# Patient Record
Sex: MALE | Race: WHITE | NOT HISPANIC OR LATINO | Employment: FULL TIME | URBAN - METROPOLITAN AREA
[De-identification: names, ages, dates, MRNs, and addresses within clinical notes are randomized per-mention and may not be internally consistent; named-entity substitution may affect disease eponyms.]

---

## 2019-03-27 ENCOUNTER — HOSPITAL ENCOUNTER (OUTPATIENT)
Dept: OTHER | Facility: HOSPITAL | Age: 49
Discharge: HOME | End: 2019-03-27
Attending: NURSE PRACTITIONER

## 2019-03-28 ENCOUNTER — OFFICE VISIT CONVERTED (OUTPATIENT)
Dept: ORTHOPEDIC SURGERY | Facility: CLINIC | Age: 49
End: 2019-03-28
Attending: ORTHOPAEDIC SURGERY

## 2019-04-08 ENCOUNTER — HOSPITAL ENCOUNTER (OUTPATIENT)
Dept: PREADMISSION TESTING | Facility: HOSPITAL | Age: 49
Discharge: HOME | End: 2019-04-08
Attending: ORTHOPAEDIC SURGERY

## 2019-04-08 LAB
ANION GAP SERPL CALC-SCNC: 18 MMOL/L (ref 8–19)
APTT BLD: 23.7 S (ref 22.2–34.2)
BASOPHILS # BLD AUTO: 0.07 10*3/UL (ref 0–0.2)
BASOPHILS NFR BLD AUTO: 1 % (ref 0–3)
BUN SERPL-MCNC: 16 MG/DL (ref 5–25)
BUN/CREAT SERPL: 23 {RATIO} (ref 6–20)
CALCIUM SERPL-MCNC: 9.9 MG/DL (ref 8.7–10.4)
CHLORIDE SERPL-SCNC: 97 MMOL/L (ref 99–111)
CONV ABS IMM GRAN: 0.02 10*3/UL (ref 0–0.2)
CONV CO2: 25 MMOL/L (ref 22–32)
CONV IMMATURE GRAN: 0.3 % (ref 0–1.8)
CREAT UR-MCNC: 0.7 MG/DL (ref 0.7–1.2)
DEPRECATED RDW RBC AUTO: 41.6 FL (ref 35.1–43.9)
EOSINOPHIL # BLD AUTO: 0.16 10*3/UL (ref 0–0.7)
EOSINOPHIL # BLD AUTO: 2.4 % (ref 0–7)
ERYTHROCYTE [DISTWIDTH] IN BLOOD BY AUTOMATED COUNT: 14.2 % (ref 11.6–14.4)
GFR SERPLBLD BASED ON 1.73 SQ M-ARVRAT: >60 ML/MIN/{1.73_M2}
GLUCOSE SERPL-MCNC: 179 MG/DL (ref 70–99)
HBA1C MFR BLD: 14.9 G/DL (ref 14–18)
HCT VFR BLD AUTO: 43 % (ref 42–52)
INR PPP: 0.95 (ref 2–3)
LYMPHOCYTES # BLD AUTO: 2.06 10*3/UL (ref 1–5)
MCH RBC QN AUTO: 28.4 PG (ref 27–31)
MCHC RBC AUTO-ENTMCNC: 34.7 G/DL (ref 33–37)
MCV RBC AUTO: 82.1 FL (ref 80–96)
MONOCYTES # BLD AUTO: 0.63 10*3/UL (ref 0.2–1.2)
MONOCYTES NFR BLD AUTO: 9.4 % (ref 3–10)
NEUTROPHILS # BLD AUTO: 3.77 10*3/UL (ref 2–8)
NEUTROPHILS NFR BLD AUTO: 56.2 % (ref 30–85)
NRBC CBCN: 0 % (ref 0–0.7)
OSMOLALITY SERPL CALC.SUM OF ELEC: 288 MOSM/KG (ref 273–304)
PLATELET # BLD AUTO: 230 10*3/UL (ref 130–400)
PMV BLD AUTO: 11 FL (ref 9.4–12.4)
POTASSIUM SERPL-SCNC: 4.3 MMOL/L (ref 3.5–5.3)
PROTHROMBIN TIME: 10 S (ref 9.4–12)
RBC # BLD AUTO: 5.24 10*6/UL (ref 4.7–6.1)
SODIUM SERPL-SCNC: 136 MMOL/L (ref 135–147)
VARIANT LYMPHS NFR BLD MANUAL: 30.7 % (ref 20–45)
WBC # BLD AUTO: 6.71 10*3/UL (ref 4.8–10.8)

## 2019-05-09 ENCOUNTER — OFFICE VISIT CONVERTED (OUTPATIENT)
Dept: ORTHOPEDIC SURGERY | Facility: CLINIC | Age: 49
End: 2019-05-09
Attending: ORTHOPAEDIC SURGERY

## 2019-05-13 ENCOUNTER — HOSPITAL ENCOUNTER (OUTPATIENT)
Dept: PHYSICAL THERAPY | Facility: CLINIC | Age: 49
Setting detail: RECURRING SERIES
Discharge: HOME | End: 2019-07-15
Attending: ORTHOPAEDIC SURGERY

## 2019-06-11 ENCOUNTER — OFFICE VISIT CONVERTED (OUTPATIENT)
Dept: ORTHOPEDIC SURGERY | Facility: CLINIC | Age: 49
End: 2019-06-11
Attending: ORTHOPAEDIC SURGERY

## 2019-11-19 ENCOUNTER — OFFICE VISIT CONVERTED (OUTPATIENT)
Dept: ORTHOPEDIC SURGERY | Facility: CLINIC | Age: 49
End: 2019-11-19
Attending: ORTHOPAEDIC SURGERY

## 2019-11-19 ENCOUNTER — CONVERSION ENCOUNTER (OUTPATIENT)
Dept: ORTHOPEDIC SURGERY | Facility: CLINIC | Age: 49
End: 2019-11-19

## 2021-05-15 VITALS — BODY MASS INDEX: 37.8 KG/M2 | OXYGEN SATURATION: 91 % | WEIGHT: 270 LBS | HEART RATE: 86 BPM | HEIGHT: 71 IN

## 2021-05-15 VITALS — OXYGEN SATURATION: 99 % | WEIGHT: 260 LBS | HEIGHT: 71 IN | HEART RATE: 84 BPM | BODY MASS INDEX: 36.4 KG/M2

## 2021-05-15 VITALS — HEART RATE: 77 BPM | OXYGEN SATURATION: 95 % | HEIGHT: 71 IN

## 2021-05-15 VITALS — HEIGHT: 71 IN | OXYGEN SATURATION: 98 % | HEART RATE: 77 BPM

## 2022-07-21 ENCOUNTER — TRANSCRIBE ORDERS (OUTPATIENT)
Dept: PHYSICAL THERAPY | Facility: CLINIC | Age: 52
End: 2022-07-21

## 2022-07-21 ENCOUNTER — TREATMENT (OUTPATIENT)
Dept: PHYSICAL THERAPY | Facility: CLINIC | Age: 52
End: 2022-07-21

## 2022-07-21 DIAGNOSIS — S66.127D LACERATION OF FLEXOR MUSCLE, FASCIA AND TENDON OF LEFT LITTLE FINGER AT WRIST AND HAND LEVEL, SUBSEQUENT ENCOUNTER: Primary | ICD-10-CM

## 2022-07-21 DIAGNOSIS — M79.644 PAIN OF FINGER OF RIGHT HAND: ICD-10-CM

## 2022-07-21 DIAGNOSIS — M25.641 STIFFNESS OF FINGER JOINT OF RIGHT HAND: ICD-10-CM

## 2022-07-21 PROCEDURE — 97166 OT EVAL MOD COMPLEX 45 MIN: CPT | Performed by: OCCUPATIONAL THERAPIST

## 2022-07-21 PROCEDURE — 97110 THERAPEUTIC EXERCISES: CPT | Performed by: OCCUPATIONAL THERAPIST

## 2022-07-21 NOTE — PROGRESS NOTES
Outpatient Occupational Therapy Ortho Initial Evaluation    Patient: Manuel Demarco   : 1970  Diagnosis/ICD-10 Code:  Laceration of flexor muscle, fascia and tendon of left little finger at wrist and hand level, subsequent encounter [S66.127D]  Referring practitioner: Bradley Valenzuela, *  Date of Initial Visit: 2022  Today's Date: 2022  Patient seen for 1 sessions               Subjective Questionnaire: QuickDASH: 45      Subjective Evaluation    History of Present Illness  Date of onset: 2022  Date of surgery: 7/15/2022  Mechanism of injury: Cutting the exhaust off of the truck on 22 the saw blade cut through  FDS and FDP of R SF.  Surgical repair 7/15/22 brace placed on 22      Patient Occupation:  - cooks and manages   Precautions and Work Restrictions: one handed work Quality of life: excellent    Pain  Current pain ratin  At worst pain rating: 3  Quality: needle-like and throbbing  Relieving factors: medications (norco 325)  Aggravating factors: repetitive movement and lifting  Progression: improved    Social Support  Lives in: one-story house  Lives with: spouse and young children    Hand dominance: left    Treatments  Previous treatment: immobilization  Patient Goals  Patient goals for therapy: decreased edema, increased motion, decreased pain, increased strength, independence with ADLs/IADLs and return to work  Patient goal: get back to normal         Past Medical hx: diabetic, heart attacks x 2, high blood pressure    Objective          Neurological Testing     Sensation     Wrist/Hand     Right   Diminished: light touch    Comments   Right light touch: 3.61 RF and SF    Active Range of Motion     Additional Active Range of Motion Details  0-70 0-70 0-35  0-70 0-70 0-40  0-70 0-70 0-35  10-40 15-50 0-40    Swelling     Right Wrist/Hand   Little     Middle: 6.6 cm  Circumference MCP: 21.5 cm  Circumference wrist: 17.3 cm          Assessment & Plan      Assessment  Impairments: abnormal coordination, abnormal muscle firing, abnormal or restricted ROM, activity intolerance, impaired physical strength, lacks appropriate home exercise program, pain with function, safety issue and weight-bearing intolerance  Functional Limitations: carrying objects, lifting, pulling, pushing, reaching behind back, reaching overhead and unable to perform repetitive tasks  Assessment details: Pt presents with s/p FDS and FDP laceration repair in R SF  In dorsal blocking splint with stiffness, pain, edema, and decreased functional .  Pt is limited secondary to orthopedic precautions, decreased strength, increased swelling, and decreased use of R hand.   Prognosis: good    Goals  Plan Goals: 1. The patient complains of pain in the R sf                   LTG 1: 12 weeks:  The patient will report a pain rating of 0/10 or better in order to improve sleep quality and tolerance to performance of activities of daily living.                                  STATUS:  New                  STG 1a: 4 weeks:  The patient will report a pain rating of 2/10 or better.                                   STATUS:  New  2. The patient has limited ROM of the R SF                  LTG 2: 12 weeks:  The patient will demonstrate 240 degrees of HEARD to allow the patient to  knife for cooking/ meal prep.                                  STATUS:  New                   STG 2a: 4 weeks:  The patient will demonstrate 120 degrees of HEARD.                                  STATUS:  New                             3. The patient has limited strength of the R hand.                  LTG 3: 12 weeks:  The patient will demonstrate 50# in order to return to lifting and gripping.                                  STATUS:  New                  STG 3a: 8 weeks:  The patient will demonstrate tolerance to light strengthening without adverse reaction.                                  STATUS:  New  4. Carrying, Moving, and  Handling Objects Functional Limitation                                   LTG 4: 12 weeks:  The patient will demonstrate 0% limitation by achieving a score of 11 on the Quick DASH.                                  STATUS:  New                  STG 4a: 4 weeks:  The patient will demonstrate 20-39% limitation by achieving a score of 27 on the Quick DASH.                                    STATUS:  New                    TREATMENT: Orthotic fabrication/fitting/management and training, Manual therapy, therapeutic exercise, home exercise instruction, and modalities as needed to include: electrical stimulation, ultrasound, moist heat, paraffin, fluidotherapy and ice.      Plan  Planned modality interventions: TENS, thermotherapy (hydrocollator packs), thermotherapy (paraffin bath), ultrasound and electrical stimulation/Russian stimulation  Other planned modality interventions: fluidotherapy  Planned therapy interventions: manual therapy, ADL retraining, motor coordination training, neuromuscular re-education, soft tissue mobilization, fine motor coordination training, body mechanics training, balance/weight-bearing training, functional ROM exercises, flexibility, spinal/joint mobilization, strengthening, stretching, therapeutic activities, IADL retraining, joint mobilization and home exercise program  Frequency: 2x week  Duration in weeks: 12  Treatment plan discussed with: patient        Patient is indicated for skilled occupational therapy services.    History # of Personal Factors and/or Comorbidities: MODERATE (1-2)  Examination of Body System(s): # of elements: MODERATE (3)  Clinical Presentation: EVOLVING  Clinical Decision Making: MODERATE     Evaluation:  Low Complexity:    0     mins  39870;  Mod Complexity:    30     mins  07295;  High Complexity:    0     mins  09334;    Timed:  Manual Therapy:    0     mins  33563;  Therapeutic Exercise:    10     mins  52497;  Therapeutic Activity:    0     mins  89843;      Neuromuscular Michael:    0    mins  12664;    Ultrasound:     0     mins  28388;    Electrical Stimulation:    0     mins  55709;    Untimed:  Electrical Stimulation:    0     mins  68763 ( );  Fluidotherapy:        0    mins  59497  Paraffin:                          0    mins  18788    Timed Treatment:   10   mins   Total Treatment:     40   mins      OT SIGNATURE: DHARMESH Greene, OTR/L, CHT     Electronically signed    [place] LICENSE: [id number]   DATE TREATMENT INITIATED: 7/21/2022    Initial Certification  Certification Period: 7/21/2022 thru 10/18/2022  I certify that the therapy services are furnished while this patient is under my care.  The services outlined above are required by this patient, and will be reviewed every 90 days.     Signature:______________________________________________ PHYSICIAN:  Bradley Barcenas MD   NPI: [id number]                                      DATE:    Please sign and return via fax to [phone]   Thank you, [facility]ational Therapy.

## 2022-07-25 ENCOUNTER — TREATMENT (OUTPATIENT)
Dept: PHYSICAL THERAPY | Facility: CLINIC | Age: 52
End: 2022-07-25

## 2022-07-25 DIAGNOSIS — S66.127D LACERATION OF FLEXOR MUSCLE, FASCIA AND TENDON OF LEFT LITTLE FINGER AT WRIST AND HAND LEVEL, SUBSEQUENT ENCOUNTER: Primary | ICD-10-CM

## 2022-07-25 DIAGNOSIS — M25.641 STIFFNESS OF FINGER JOINT OF RIGHT HAND: ICD-10-CM

## 2022-07-25 DIAGNOSIS — M79.644 PAIN OF FINGER OF RIGHT HAND: ICD-10-CM

## 2022-07-25 PROCEDURE — 97112 NEUROMUSCULAR REEDUCATION: CPT | Performed by: OCCUPATIONAL THERAPIST

## 2022-07-25 PROCEDURE — 97530 THERAPEUTIC ACTIVITIES: CPT | Performed by: OCCUPATIONAL THERAPIST

## 2022-07-25 NOTE — PROGRESS NOTES
Occupational Therapy Daily Treatment Note      Patient: Manuel Demarco   : 1970  Referring practitioner: Bradley Valenzuela, *  Date of Initial Visit: Type: THERAPY  Noted: 2022  Today's Date: 2022  Patient seen for 2 sessions    ICD-10-CM ICD-9-CM   1. Laceration of flexor muscle, fascia and tendon of left little finger at wrist and hand level, subsequent encounter  S66.127D 842.10   2. Stiffness of finger joint of right hand  M25.641 719.54   3. Pain of finger of right hand  M79.644 729.5          Manuel Demarco reports I have been keeping it moving.       Objective   See Exercise, Manual, and Modality Logs for complete treatment.   SF  45-60  0-70  0-50    22.1 cm MP  6.5 cm PIP SF      Assessment/Plan  Pt is doing excellent with AROM, and edema control.  1x/wk until 4 weeks s/p sx and then back to 2x/wk    Cont per POC           Timed:  Manual Therapy:    0     mins  90414;  Therapeutic Exercise:    10     mins  69240;  Therapeutic Activity:    10     mins  49989;     Neuromuscular Michael:    10    mins  44219;    Ultrasound:     0     mins  84667;    Electrical Stimulation:    0     mins  64066;    Untimed:  Electrical Stimulation:    0     mins  73556 ( );  Fluidotherapy:        0    mins  43374  Paraffin:                          0    mins  83986    Timed Treatment:   30   mins   Total Treatment:     30   mins    OT SIGNATURE: DHARMESH Greene, OTR/L, CHT     Electronically signed    [place] LICENSE: [id number]

## 2022-08-01 ENCOUNTER — TREATMENT (OUTPATIENT)
Dept: PHYSICAL THERAPY | Facility: CLINIC | Age: 52
End: 2022-08-01

## 2022-08-01 DIAGNOSIS — M25.641 STIFFNESS OF FINGER JOINT OF RIGHT HAND: ICD-10-CM

## 2022-08-01 DIAGNOSIS — S66.127D LACERATION OF FLEXOR MUSCLE, FASCIA AND TENDON OF LEFT LITTLE FINGER AT WRIST AND HAND LEVEL, SUBSEQUENT ENCOUNTER: Primary | ICD-10-CM

## 2022-08-01 DIAGNOSIS — M79.644 PAIN OF FINGER OF RIGHT HAND: ICD-10-CM

## 2022-08-01 PROCEDURE — 97110 THERAPEUTIC EXERCISES: CPT | Performed by: OCCUPATIONAL THERAPIST

## 2022-08-01 PROCEDURE — 97112 NEUROMUSCULAR REEDUCATION: CPT | Performed by: OCCUPATIONAL THERAPIST

## 2022-08-01 NOTE — PROGRESS NOTES
Occupational Therapy Daily Treatment Note      Patient: Manuel Demarco   : 1970  Referring practitioner: Bradley Valenzuela, *  Date of Initial Visit: Type: THERAPY  Noted: 2022  Today's Date: 2022  Patient seen for 3 sessions    ICD-10-CM ICD-9-CM   1. Laceration of flexor muscle, fascia and tendon of left little finger at wrist and hand level, subsequent encounter  S66.127D 842.10   2. Stiffness of finger joint of right hand  M25.601 719.54   3. Pain of finger of right hand  M79.645 729.5          Manuel Demarco reports I am feeling good, ready to be out of the splint     Objective   See Exercise, Manual, and Modality Logs for complete treatment.   Full loose fist into palm this date.    Assessment/Plan  Pt is moving excellent, able to hold place and holds fair this date.       Cont per POC           Timed:  Manual Therapy:    0     mins  92218;  Therapeutic Exercise:    10     mins  29784;  Therapeutic Activity:    10     mins  01625;     Neuromuscular Michael:    10    mins  20896;    Ultrasound:     0     mins  20299;    Electrical Stimulation:    0     mins  52191;    Untimed:  Electrical Stimulation:    0     mins  84711 ( );  Fluidotherapy:        0    mins  44567  Paraffin:                          0    mins  35975    Timed Treatment:   30   mins   Total Treatment:     30   mins    OT SIGNATURE: DHARMESH Greene, OTR/L, CHT     Electronically signed    [place] LICENSE: [id number]

## 2022-08-08 ENCOUNTER — TREATMENT (OUTPATIENT)
Dept: PHYSICAL THERAPY | Facility: CLINIC | Age: 52
End: 2022-08-08

## 2022-08-08 DIAGNOSIS — M79.644 PAIN OF FINGER OF RIGHT HAND: ICD-10-CM

## 2022-08-08 DIAGNOSIS — M25.641 STIFFNESS OF FINGER JOINT OF RIGHT HAND: ICD-10-CM

## 2022-08-08 DIAGNOSIS — S66.127D LACERATION OF FLEXOR MUSCLE, FASCIA AND TENDON OF LEFT LITTLE FINGER AT WRIST AND HAND LEVEL, SUBSEQUENT ENCOUNTER: Primary | ICD-10-CM

## 2022-08-08 PROCEDURE — 97110 THERAPEUTIC EXERCISES: CPT | Performed by: OCCUPATIONAL THERAPIST

## 2022-08-08 PROCEDURE — 97112 NEUROMUSCULAR REEDUCATION: CPT | Performed by: OCCUPATIONAL THERAPIST

## 2022-08-08 NOTE — PROGRESS NOTES
Occupational Therapy Daily Treatment Note      Patient: Manuel Demarco   : 1970  Referring practitioner: Bradley Valenzuela, *  Date of Initial Visit: Type: THERAPY  Noted: 2022  Today's Date: 2022  Patient seen for 4 sessions    ICD-10-CM ICD-9-CM   1. Laceration of flexor muscle, fascia and tendon of left little finger at wrist and hand level, subsequent encounter  S66.127D 842.10   2. Stiffness of finger joint of right hand  M25.641 719.54   3. Pain of finger of right hand  M79.644 729.5          Manuel Demarco reports my palm is constantly burning now        Objective   See Exercise, Manual, and Modality Logs for complete treatment.   Burning pain improved with sensory stimulation and scar massage.    Assessment/Plan  About half of the stitches are out, continue to encourage scar mobilization and STM in palm.  Added textured desensitization in palm with HEP in brace to protect over extension of MP      Cont per POC           Timed:  Manual Therapy:    10     mins  93699;  Therapeutic Exercise:    10     mins  53525;  Therapeutic Activity:    0     mins  92904;     Neuromuscular Michael:    10    mins  22571;    Ultrasound:     0     mins  39852;    Electrical Stimulation:    0     mins  66403;    Untimed:  Electrical Stimulation:    0     mins  04268 ( );  Fluidotherapy:        0    mins  29551  Paraffin:                          0    mins  11979    Timed Treatment:   30   mins   Total Treatment:     30   mins    OT SIGNATURE: DHARMESH Greene, OTR/L, CHT     Electronically signed    [place] LICENSE: [id number]

## 2022-08-15 ENCOUNTER — TELEPHONE (OUTPATIENT)
Dept: PHYSICAL THERAPY | Facility: CLINIC | Age: 52
End: 2022-08-15

## 2022-09-01 ENCOUNTER — TREATMENT (OUTPATIENT)
Dept: PHYSICAL THERAPY | Facility: CLINIC | Age: 52
End: 2022-09-01

## 2022-09-01 DIAGNOSIS — S66.127D LACERATION OF FLEXOR MUSCLE, FASCIA AND TENDON OF LEFT LITTLE FINGER AT WRIST AND HAND LEVEL, SUBSEQUENT ENCOUNTER: Primary | ICD-10-CM

## 2022-09-01 DIAGNOSIS — M79.644 PAIN OF FINGER OF RIGHT HAND: ICD-10-CM

## 2022-09-01 DIAGNOSIS — M25.641 STIFFNESS OF FINGER JOINT OF RIGHT HAND: ICD-10-CM

## 2022-09-01 PROCEDURE — 97530 THERAPEUTIC ACTIVITIES: CPT | Performed by: OCCUPATIONAL THERAPIST

## 2022-09-01 PROCEDURE — 97112 NEUROMUSCULAR REEDUCATION: CPT | Performed by: OCCUPATIONAL THERAPIST

## 2022-09-01 NOTE — PROGRESS NOTES
Re-Assessment / Re-Certification      Patient: Manuel Demarco   : 1970  Diagnosis/ICD-10 Code:  Laceration of flexor muscle, fascia and tendon of left little finger at wrist and hand level, subsequent encounter [S66.127D]  Referring practitioner: Bradley Valenzuela, *  Date of Initial Visit: Type: THERAPY  Noted: 2022  Today's Date: 2022  Patient seen for 5 sessions      Subjective:   Manuel Demarco reports: Pt reports he ruptured his tendon 8/15/22 in his sleep.  Had second repair 22. Patient is 14 days s/p flexor tendon repair of FDS and FDP.  Presents in post op dressings.   Subjective Questionnaire: QuickDASH: 55  Clinical Progress: improved  Home Program Compliance: No  Treatment has included: therapeutic exercise, neuromuscular re-education, manual therapy and therapeutic activity    Subjective   Objective   Neurological Testing      Sensation      Wrist/Hand      Right   Diminished: light touch     Comments   Right light touch: 2.83 SW all digits     Active Range of Motion     Additional Active Range of Motion Details  50-60     20-35     0-20  50-60     25-40     0-20  50-60     25-40     0-15  35-40     30-30     15-30     Swelling      Right Wrist/Hand   Little     Middle: 6 cm  Circumference MCP: 21.2 cm  Circumference wrist: 18.5 cm   Assessment/Plan    Visit Diagnoses:    ICD-10-CM ICD-9-CM   1. Laceration of flexor muscle, fascia and tendon of left little finger at wrist and hand level, subsequent encounter  S66.127D 842.10   2. Stiffness of finger joint of right hand  M25.641 719.54   3. Pain of finger of right hand  M79.644 729.5       Progress toward previous goals: Partially Met  Plan Goals: 1. The patient complains of pain in the R sf                         LTG 1: 12 weeks:  The patient will report a pain rating of 0/10 or better in order to improve sleep quality and tolerance to performance of activities of daily living.                                  STATUS:  NOT  MET                  STG 1a: 4 weeks:  The patient will report a pain rating of 2/10 or better.                                   STATUS:  NOT MET  2. The patient has limited ROM of the R SF                  LTG 2: 12 weeks:  The patient will demonstrate 240 degrees of HEARD to allow the patient to  knife for cooking/ meal prep.                                  STATUS:  NOT MET                  STG 2a: 4 weeks:  The patient will demonstrate 120 degrees of HEARD.                                  STATUS:  NOT MET                          3. The patient has limited strength of the R hand.                  LTG 3: 12 weeks:  The patient will demonstrate 50# in order to return to lifting and gripping.                                  STATUS:  NOT MET                  STG 3a: 8 weeks:  The patient will demonstrate tolerance to light strengthening without adverse reaction.                                  STATUS:  NOT MET  4. Carrying, Moving, and Handling Objects Functional Limitation                                   LTG 4: 12 weeks:  The patient will demonstrate 0% limitation by achieving a score of 11 on the Quick DASH.                                  STATUS:  NOT MET                  STG 4a: 4 weeks:  The patient will demonstrate 20-39% limitation by achieving a score of 27 on the Quick DASH.                                    STATUS:  NOT MET      Recommendations: Continue as planned  Timeframe: 2 months  Prognosis to achieve goals: good      OT SIGNATURE: DHARMESH Greene, OTR/L, CHT     Electronically signed    [place] LICENSE: [id number]   DATE TREATMENT INITIATED: 9/1/2022      90 Day Recertification  Certification Period: 9/1/2022 thru 11/29/2022  I certify that the therapy services are furnished while this patient is under my care.  The services outlined above are required by this patient, and will be reviewed every 90 days.      Based upon review of the patient's progress and continued therapy plan, it is my medical  opinion that Manuel Demarco should continue occupational therapy treatment at [facility]  [address]  [place] [address] [phone].    Signature: __________________________________  PHYSICIAN: Bradley Barcenas MD  NPI: [id number]                                      DATE:      Timed:  Manual Therapy:    0     mins  16675;  Therapeutic Exercise:    10     mins  20439;  Therapeutic Activity:    10     mins  69763;     Neuromuscular Michael:    10    mins  95766;    Ultrasound:     0     mins  91343;    Electrical Stimulation:    0     mins  44826;    Untimed:  Electrical Stimulation:    0     mins  16985 ( );  Fluidotherapy:        0    mins  73872  Paraffin:                          0    mins  23046    Timed Treatment:   30   mins   Total Treatment:     30   mins

## 2022-09-06 ENCOUNTER — TREATMENT (OUTPATIENT)
Dept: PHYSICAL THERAPY | Facility: CLINIC | Age: 52
End: 2022-09-06

## 2022-09-06 DIAGNOSIS — S66.127D LACERATION OF FLEXOR MUSCLE, FASCIA AND TENDON OF LEFT LITTLE FINGER AT WRIST AND HAND LEVEL, SUBSEQUENT ENCOUNTER: Primary | ICD-10-CM

## 2022-09-06 DIAGNOSIS — M25.641 STIFFNESS OF FINGER JOINT OF RIGHT HAND: ICD-10-CM

## 2022-09-06 DIAGNOSIS — M79.644 PAIN OF FINGER OF RIGHT HAND: ICD-10-CM

## 2022-09-06 PROCEDURE — 97530 THERAPEUTIC ACTIVITIES: CPT | Performed by: OCCUPATIONAL THERAPIST

## 2022-09-06 PROCEDURE — 97112 NEUROMUSCULAR REEDUCATION: CPT | Performed by: OCCUPATIONAL THERAPIST

## 2022-09-06 NOTE — PROGRESS NOTES
Occupational Therapy Daily Treatment Note      Patient: Manuel Demarco   : 1970  Referring practitioner: Bradley Valenzuela, *  Date of Initial Visit: Type: THERAPY  Noted: 2022  Today's Date: 2022  Patient seen for 6 sessions    ICD-10-CM ICD-9-CM   1. Laceration of flexor muscle, fascia and tendon of left little finger at wrist and hand level, subsequent encounter  S66.127D 842.10   2. Stiffness of finger joint of right hand  M25.641 719.54   3. Pain of finger of right hand  M79.644 729.5          Manuel Demarco reports I am doing better.      Objective   See Exercise, Manual, and Modality Logs for complete treatment.   Pt progressing with PROM into flexion.  Swelling pockets noted, but decreased after PROM. Added gentle compression sleeve under brace.  Patient educated and return verbalized understanding of wear and care.     Assessment/Plan  Passively can tolerate almost full fist, trace movement.  Did not have patient do active ROM       Cont per POC           Timed:  Manual Therapy:    10     mins  28711;  Therapeutic Exercise:    0     mins  06274;  Therapeutic Activity:    10     mins  01172;     Neuromuscular Michael:    10    mins  38622;    Ultrasound:     0     mins  13497;    Electrical Stimulation:    0     mins  45816;    Untimed:  Electrical Stimulation:    0     mins  02947 ( );  Fluidotherapy:        0    mins  96470  Paraffin:                          0    mins  67696    Timed Treatment:   30   mins   Total Treatment:     30   mins    OT SIGNATURE: DHARMESH Greene, OTR/L, CHT     Electronically signed    [place] LICENSE: [id number]

## 2022-09-14 ENCOUNTER — TELEPHONE (OUTPATIENT)
Dept: PHYSICAL THERAPY | Facility: CLINIC | Age: 52
End: 2022-09-14

## 2022-09-19 ENCOUNTER — TREATMENT (OUTPATIENT)
Dept: PHYSICAL THERAPY | Facility: CLINIC | Age: 52
End: 2022-09-19

## 2022-09-19 DIAGNOSIS — M79.644 PAIN OF FINGER OF RIGHT HAND: ICD-10-CM

## 2022-09-19 DIAGNOSIS — M25.641 STIFFNESS OF FINGER JOINT OF RIGHT HAND: ICD-10-CM

## 2022-09-19 DIAGNOSIS — S66.127D LACERATION OF FLEXOR MUSCLE, FASCIA AND TENDON OF LEFT LITTLE FINGER AT WRIST AND HAND LEVEL, SUBSEQUENT ENCOUNTER: Primary | ICD-10-CM

## 2022-09-19 PROCEDURE — 97530 THERAPEUTIC ACTIVITIES: CPT | Performed by: OCCUPATIONAL THERAPIST

## 2022-09-19 PROCEDURE — 97110 THERAPEUTIC EXERCISES: CPT | Performed by: OCCUPATIONAL THERAPIST

## 2022-09-19 NOTE — PROGRESS NOTES
Occupational Therapy Daily Treatment Note      Patient: Manuel Demarco   : 1970  Referring practitioner: Bradley Valenzuela, *  Date of Initial Visit: Type: THERAPY  Noted: 2022  Today's Date: 2022  Patient seen for 7 sessions    ICD-10-CM ICD-9-CM   1. Laceration of flexor muscle, fascia and tendon of left little finger at wrist and hand level, subsequent encounter  S66.127D 842.10   2. Stiffness of finger joint of right hand  M25.381 719.54   3. Pain of finger of right hand  M25.645 729.5          Manuel Demarco reports I have been taking my brace off at home when I'm sitting because the swelling needs to go down.  I don't do anything when it is off, and put it on when I use it.     Objective   See Exercise, Manual, and Modality Logs for complete treatment.   Pt having poor tendon excursion of SF both FDS and FDP caught in scar tissue.    Assessment/Plan  Scar adhesions limiting functional fisting, continued edema      Cont per POC           Timed:  Manual Therapy:    10     mins  93927;  Therapeutic Exercise:    10     mins  36503;  Therapeutic Activity:    10     mins  17401;     Neuromuscular Michael:    0    mins  92126;    Ultrasound:     0     mins  30205;    Electrical Stimulation:    0     mins  89892;    Untimed:  Electrical Stimulation:    0     mins  17453 ( );  Fluidotherapy:        0    mins  67363  Paraffin:                          0    mins  84728    Timed Treatment:   30   mins   Total Treatment:     30   mins    OT SIGNATURE: DHARMESH Greene, OTR/L, CHT     Electronically signed    [place] LICENSE: [id number]

## 2022-09-20 ENCOUNTER — TREATMENT (OUTPATIENT)
Dept: PHYSICAL THERAPY | Facility: CLINIC | Age: 52
End: 2022-09-20

## 2022-09-20 DIAGNOSIS — M79.644 PAIN OF FINGER OF RIGHT HAND: ICD-10-CM

## 2022-09-20 DIAGNOSIS — M25.641 STIFFNESS OF FINGER JOINT OF RIGHT HAND: ICD-10-CM

## 2022-09-20 DIAGNOSIS — S66.127D LACERATION OF FLEXOR MUSCLE, FASCIA AND TENDON OF LEFT LITTLE FINGER AT WRIST AND HAND LEVEL, SUBSEQUENT ENCOUNTER: Primary | ICD-10-CM

## 2022-09-20 PROCEDURE — 97110 THERAPEUTIC EXERCISES: CPT | Performed by: OCCUPATIONAL THERAPIST

## 2022-09-20 PROCEDURE — 97530 THERAPEUTIC ACTIVITIES: CPT | Performed by: OCCUPATIONAL THERAPIST

## 2022-09-20 NOTE — PROGRESS NOTES
Occupational Therapy Daily Treatment Note      Patient: Manuel Demarco   : 1970  Referring practitioner: Bradley Valenzuela, *  Date of Initial Visit: Type: THERAPY  Noted: 2022  Today's Date: 2022  Patient seen for 8 sessions    ICD-10-CM ICD-9-CM   1. Laceration of flexor muscle, fascia and tendon of left little finger at wrist and hand level, subsequent encounter  S66.127D 842.10   2. Stiffness of finger joint of right hand  M25.641 719.54   3. Pain of finger of right hand  M79.644 729.5          Manuel Demarco reports I got a stitch out of the palm.  The scar pad helped it feel better.     Objective   See Exercise, Manual, and Modality Logs for complete treatment.   SF  0-60  35-40  25-45      Assessment/Plan  Pt moving with better tendon excursion into flexion this date.       Cont per POC           Timed:  Manual Therapy:    10     mins  01163;  Therapeutic Exercise:    10     mins  07041;  Therapeutic Activity:    10     mins  46890;     Neuromuscular Michael:    0    mins  87411;    Ultrasound:     0     mins  14757;    Electrical Stimulation:    0     mins  68203;    Untimed:  Electrical Stimulation:    0     mins  07970 ( );  Fluidotherapy:        0    mins  83593  Paraffin:                          0    mins  55936    Timed Treatment:   30   mins   Total Treatment:     30   mins    OT SIGNATURE: DHARMESH Greene, OTR/L, CHT     Electronically signed    [place] LICENSE: [id number]

## 2022-09-27 ENCOUNTER — TREATMENT (OUTPATIENT)
Dept: PHYSICAL THERAPY | Facility: CLINIC | Age: 52
End: 2022-09-27

## 2022-09-27 DIAGNOSIS — M79.644 PAIN OF FINGER OF RIGHT HAND: ICD-10-CM

## 2022-09-27 DIAGNOSIS — S66.127D LACERATION OF FLEXOR MUSCLE, FASCIA AND TENDON OF LEFT LITTLE FINGER AT WRIST AND HAND LEVEL, SUBSEQUENT ENCOUNTER: Primary | ICD-10-CM

## 2022-09-27 DIAGNOSIS — M25.641 STIFFNESS OF FINGER JOINT OF RIGHT HAND: ICD-10-CM

## 2022-09-27 PROCEDURE — 97530 THERAPEUTIC ACTIVITIES: CPT | Performed by: PHYSICAL THERAPIST

## 2022-09-27 PROCEDURE — 97110 THERAPEUTIC EXERCISES: CPT | Performed by: PHYSICAL THERAPIST

## 2022-09-27 NOTE — PROGRESS NOTES
Occupational Therapy Daily Treatment Note      Patient: Manuel Demarco   : 1970  Referring practitioner: Bradley Valenzuela, *  Date of Initial Visit: Type: THERAPY  Noted: 2022  Today's Date: 2022  Patient seen for 9 sessions         Manuel Demarco reports: pt reports his splint is falling apart and also that it feels like its on fire at the volar MCP joint.        Subjective Evaluation    Pain  Current pain ratin  At worst pain ratin  Quality: burning           Objective   See Exercise, Manual, and Modality Logs for complete treatment.   0-55  30-45  25-40    Assessment/Plan    Visit Diagnoses:    ICD-10-CM ICD-9-CM   1. Laceration of flexor muscle, fascia and tendon of left little finger at wrist and hand level, subsequent encounter  S66.127D 842.10   2. Stiffness of finger joint of right hand  M25.641 719.54   3. Pain of finger of right hand  M79.644 729.5       Continue per POC.         Timed:  Manual Therapy:    10     mins  12233;  Therapeutic Exercise:    10     mins  98002;     Therapeutic Activity:    10     mins  93709;    Timed Treatment:   30   mins   Total Treatment:     30   min    RAFFAELE Knapp/L  Occupational Therapist    Electronically signed   License number [id number]

## 2022-10-12 ENCOUNTER — TELEPHONE (OUTPATIENT)
Dept: PHYSICAL THERAPY | Facility: OTHER | Age: 52
End: 2022-10-12

## 2022-12-28 ENCOUNTER — DOCUMENTATION (OUTPATIENT)
Dept: PHYSICAL THERAPY | Facility: CLINIC | Age: 52
End: 2022-12-28

## 2022-12-28 DIAGNOSIS — S66.127D LACERATION OF FLEXOR MUSCLE, FASCIA AND TENDON OF LEFT LITTLE FINGER AT WRIST AND HAND LEVEL, SUBSEQUENT ENCOUNTER: Primary | ICD-10-CM

## 2022-12-28 DIAGNOSIS — M25.641 STIFFNESS OF FINGER JOINT OF RIGHT HAND: ICD-10-CM

## 2022-12-28 DIAGNOSIS — M79.644 PAIN OF FINGER OF RIGHT HAND: ICD-10-CM

## 2022-12-28 NOTE — PROGRESS NOTES
Discharge Summary  Discharge Summary from Occupational Therapy   [address] [place] [address]    Patient Information  Manuel Demarco  1970    Dates OT visit: 7/21/22-9/20/22  Number of Visits: 8     Discharge Status of Patient: See MD Note dated 9/20/22    Goals: Not Met    Visit Diagnoses:    ICD-10-CM ICD-9-CM   1. Laceration of flexor muscle, fascia and tendon of left little finger at wrist and hand level, subsequent encounter  S66.127D 842.10   2. Stiffness of finger joint of right hand  M25.641 719.54   3. Pain of finger of right hand  M79.644 729.5       Discharge Plan: Continue with current home exercise program as instructed    Comments patient noncompliant with attendance    Date of Discharge 9/20/22        DHARMESH Greene, OTR/L, CHT  Occupational Therapist, Certified Hand therapist    Electronically Signed   [place] LICENSE: [id number]

## 2023-01-05 ENCOUNTER — TRANSCRIBE ORDERS (OUTPATIENT)
Dept: ADMINISTRATIVE | Facility: HOSPITAL | Age: 53
End: 2023-01-05
Payer: COMMERCIAL

## 2023-01-05 DIAGNOSIS — M85.629: Primary | ICD-10-CM

## 2023-01-12 ENCOUNTER — APPOINTMENT (OUTPATIENT)
Dept: ULTRASOUND IMAGING | Facility: HOSPITAL | Age: 53
End: 2023-01-12
Payer: COMMERCIAL

## 2024-06-06 ENCOUNTER — TRANSCRIBE ORDERS (OUTPATIENT)
Dept: ADMINISTRATIVE | Facility: HOSPITAL | Age: 54
End: 2024-06-06
Payer: COMMERCIAL

## 2024-06-06 ENCOUNTER — HOSPITAL ENCOUNTER (OUTPATIENT)
Dept: GENERAL RADIOLOGY | Facility: HOSPITAL | Age: 54
Discharge: HOME | End: 2024-06-06
Payer: COMMERCIAL

## 2024-06-06 DIAGNOSIS — M47.9 SPONDYLOSIS: Primary | ICD-10-CM

## 2024-06-06 DIAGNOSIS — M47.9 SPONDYLOSIS: ICD-10-CM

## 2024-06-06 PROCEDURE — 72100 X-RAY EXAM L-S SPINE 2/3 VWS: CPT

## 2024-08-19 ENCOUNTER — PATIENT ROUNDING (BHMG ONLY) (OUTPATIENT)
Dept: FAMILY MEDICINE CLINIC | Facility: CLINIC | Age: 54
End: 2024-08-19

## 2024-08-19 ENCOUNTER — OFFICE VISIT (OUTPATIENT)
Dept: FAMILY MEDICINE CLINIC | Facility: CLINIC | Age: 54
End: 2024-08-19
Payer: COMMERCIAL

## 2024-08-19 VITALS
OXYGEN SATURATION: 98 % | DIASTOLIC BLOOD PRESSURE: 76 MMHG | HEART RATE: 108 BPM | HEIGHT: 72 IN | BODY MASS INDEX: 30.2 KG/M2 | SYSTOLIC BLOOD PRESSURE: 124 MMHG | WEIGHT: 223 LBS | TEMPERATURE: 97.3 F

## 2024-08-19 DIAGNOSIS — Z71.85 VACCINE COUNSELING: ICD-10-CM

## 2024-08-19 DIAGNOSIS — Z87.19 HISTORY OF PANCREATITIS: ICD-10-CM

## 2024-08-19 DIAGNOSIS — M51.36 LUMBAR DEGENERATIVE DISC DISEASE: ICD-10-CM

## 2024-08-19 DIAGNOSIS — Z95.5 STENTED CORONARY ARTERY: ICD-10-CM

## 2024-08-19 DIAGNOSIS — Z28.21 PNEUMOCOCCAL VACCINATION DECLINED: ICD-10-CM

## 2024-08-19 DIAGNOSIS — Z12.83 ENCOUNTER FOR SCREENING FOR MALIGNANT NEOPLASM OF SKIN: ICD-10-CM

## 2024-08-19 DIAGNOSIS — Z11.59 NEED FOR HEPATITIS C SCREENING TEST: ICD-10-CM

## 2024-08-19 DIAGNOSIS — Z12.5 PROSTATE CANCER SCREENING: ICD-10-CM

## 2024-08-19 DIAGNOSIS — I10 ESSENTIAL HYPERTENSION: ICD-10-CM

## 2024-08-19 DIAGNOSIS — E78.5 HYPERLIPIDEMIA, UNSPECIFIED HYPERLIPIDEMIA TYPE: ICD-10-CM

## 2024-08-19 DIAGNOSIS — F17.219 CIGARETTE NICOTINE DEPENDENCE WITH NICOTINE-INDUCED DISORDER: ICD-10-CM

## 2024-08-19 DIAGNOSIS — E66.9 CLASS 1 OBESITY WITH SERIOUS COMORBIDITY AND BODY MASS INDEX (BMI) OF 30.0 TO 30.9 IN ADULT, UNSPECIFIED OBESITY TYPE: ICD-10-CM

## 2024-08-19 DIAGNOSIS — Z76.89 ENCOUNTER TO ESTABLISH CARE: Primary | ICD-10-CM

## 2024-08-19 DIAGNOSIS — E11.9 ENCOUNTER FOR DIABETIC FOOT EXAM: ICD-10-CM

## 2024-08-19 DIAGNOSIS — I25.2 HISTORY OF MYOCARDIAL INFARCTION: ICD-10-CM

## 2024-08-19 DIAGNOSIS — Z12.11 COLON CANCER SCREENING: ICD-10-CM

## 2024-08-19 DIAGNOSIS — Z79.4 TYPE 2 DIABETES MELLITUS WITH HYPERGLYCEMIA, WITH LONG-TERM CURRENT USE OF INSULIN: ICD-10-CM

## 2024-08-19 DIAGNOSIS — Z12.2 SCREENING FOR LUNG CANCER: ICD-10-CM

## 2024-08-19 DIAGNOSIS — E55.9 VITAMIN D DEFICIENCY: ICD-10-CM

## 2024-08-19 DIAGNOSIS — M25.50 CHRONIC JOINT PAIN: ICD-10-CM

## 2024-08-19 DIAGNOSIS — G89.29 CHRONIC JOINT PAIN: ICD-10-CM

## 2024-08-19 DIAGNOSIS — E11.65 TYPE 2 DIABETES MELLITUS WITH HYPERGLYCEMIA, WITH LONG-TERM CURRENT USE OF INSULIN: ICD-10-CM

## 2024-08-19 DIAGNOSIS — K21.9 GASTROESOPHAGEAL REFLUX DISEASE, UNSPECIFIED WHETHER ESOPHAGITIS PRESENT: ICD-10-CM

## 2024-08-19 PROBLEM — M51.369 LUMBAR DEGENERATIVE DISC DISEASE: Status: ACTIVE | Noted: 2024-08-19

## 2024-08-19 PROCEDURE — 3074F SYST BP LT 130 MM HG: CPT | Performed by: NURSE PRACTITIONER

## 2024-08-19 PROCEDURE — 3078F DIAST BP <80 MM HG: CPT | Performed by: NURSE PRACTITIONER

## 2024-08-19 PROCEDURE — 99204 OFFICE O/P NEW MOD 45 MIN: CPT | Performed by: NURSE PRACTITIONER

## 2024-08-19 PROCEDURE — 1125F AMNT PAIN NOTED PAIN PRSNT: CPT | Performed by: NURSE PRACTITIONER

## 2024-08-19 RX ORDER — NALOXONE HYDROCHLORIDE 4 MG/.1ML
SPRAY NASAL
COMMUNITY
Start: 2024-06-05

## 2024-08-19 RX ORDER — ASPIRIN 81 MG/1
1 TABLET ORAL DAILY
COMMUNITY
Start: 2023-11-30

## 2024-08-19 RX ORDER — NITROGLYCERIN 0.4 MG/1
TABLET SUBLINGUAL
COMMUNITY
Start: 2024-06-05

## 2024-08-19 RX ORDER — CLOPIDOGREL BISULFATE 75 MG/1
75 TABLET ORAL DAILY
COMMUNITY

## 2024-08-19 RX ORDER — PANTOPRAZOLE SODIUM 40 MG/1
TABLET, DELAYED RELEASE ORAL
COMMUNITY
End: 2024-08-19 | Stop reason: HOSPADM

## 2024-08-19 RX ORDER — TRIAMCINOLONE ACETONIDE 1 MG/G
CREAM TOPICAL
COMMUNITY

## 2024-08-19 RX ORDER — INSULIN LISPRO 100 [IU]/ML
INJECTION, SOLUTION INTRAVENOUS; SUBCUTANEOUS
COMMUNITY

## 2024-08-19 RX ORDER — OXYCODONE HYDROCHLORIDE AND ACETAMINOPHEN 5; 325 MG/1; MG/1
1 TABLET ORAL
COMMUNITY

## 2024-08-19 RX ORDER — SEMAGLUTIDE 2.68 MG/ML
INJECTION, SOLUTION SUBCUTANEOUS
COMMUNITY
Start: 2024-07-24

## 2024-08-19 RX ORDER — ACYCLOVIR 400 MG/1
1 TABLET ORAL CONTINUOUS
Qty: 6 EACH | Refills: 1 | Status: SHIPPED | OUTPATIENT
Start: 2024-08-19

## 2024-08-19 RX ORDER — ERGOCALCIFEROL 1.25 MG/1
1 CAPSULE ORAL WEEKLY
COMMUNITY
Start: 2024-05-28

## 2024-08-19 RX ORDER — DICLOFENAC SODIUM 75 MG/1
75 TABLET, DELAYED RELEASE ORAL
COMMUNITY
Start: 2022-07-15 | End: 2024-08-19 | Stop reason: HOSPADM

## 2024-08-19 RX ORDER — BLOOD SUGAR DIAGNOSTIC
1 STRIP MISCELLANEOUS
Qty: 400 EACH | Refills: 3 | Status: SHIPPED | OUTPATIENT
Start: 2024-08-19

## 2024-08-19 RX ORDER — LISINOPRIL 20 MG/1
1 TABLET ORAL DAILY
COMMUNITY
Start: 2023-11-30

## 2024-08-19 RX ORDER — FAMOTIDINE 40 MG/1
1 TABLET, FILM COATED ORAL DAILY
COMMUNITY
Start: 2022-07-15

## 2024-08-19 RX ORDER — TICAGRELOR 90 MG/1
1 TABLET ORAL 2 TIMES DAILY
COMMUNITY
End: 2024-08-19 | Stop reason: HOSPADM

## 2024-08-19 RX ORDER — INSULIN GLARGINE 100 [IU]/ML
INJECTION, SOLUTION SUBCUTANEOUS
COMMUNITY
End: 2024-08-26

## 2024-08-19 RX ORDER — ATORVASTATIN CALCIUM 80 MG/1
1 TABLET, FILM COATED ORAL DAILY
COMMUNITY
Start: 2023-11-30

## 2024-08-19 RX ORDER — METOPROLOL SUCCINATE 25 MG/1
TABLET, EXTENDED RELEASE ORAL
COMMUNITY
End: 2024-08-19 | Stop reason: SDUPTHER

## 2024-08-19 RX ORDER — FENOFIBRATE 150 MG/1
CAPSULE ORAL
COMMUNITY
End: 2024-08-19 | Stop reason: HOSPADM

## 2024-08-19 RX ORDER — PEN NEEDLE, DIABETIC 32GX 5/32"
NEEDLE, DISPOSABLE MISCELLANEOUS SEE ADMIN INSTRUCTIONS
COMMUNITY
Start: 2024-07-24

## 2024-08-19 RX ORDER — ACYCLOVIR 400 MG/1
1 TABLET ORAL CONTINUOUS
Qty: 1 EACH | Refills: 0 | Status: SHIPPED | OUTPATIENT
Start: 2024-08-19

## 2024-08-19 RX ORDER — BACLOFEN 20 MG
TABLET ORAL
COMMUNITY
End: 2024-08-19 | Stop reason: HOSPADM

## 2024-08-19 RX ORDER — INSULIN GLARGINE 100 [IU]/ML
INJECTION, SOLUTION SUBCUTANEOUS
COMMUNITY
Start: 2024-07-22 | End: 2024-08-26 | Stop reason: SDUPTHER

## 2024-08-19 NOTE — PROGRESS NOTES
My name is Francisco J Borrero      I am  with [facility] [place] [place] [facility]  [facility]  [address] [place] [address] [address] [place] [address] [phone].    I am calling to officially welcome you to our practice and ask about your recent visit.    Tell me about your visit with us. What things went well?       We're always looking for ways to make our patients' experiences even better. Do you have recommendations on ways we may improve?      Overall were you satisfied with your first visit to our practice?        I appreciate you taking the time to speak with me today. Is there anything else I can do for you?     Thank you, and have a great day.

## 2024-08-19 NOTE — PROGRESS NOTES
Chief Complaint  Establish Care (NEW Patient)    History of Present Illness  Manuel Demarco is a 53 y.o. male who presents to [facility] with a past medical history of    Past Medical History:   Diagnosis Date    Allergic 2011    Arthritis 2000    Coronary artery disease 2018    Diabetes mellitus     Not sure    GERD (gastroesophageal reflux disease)     Hyperlipidemia     Hypertension     Low back pain     Myocardial infarction 2018    Heart attack feb 2018 and again may 2020    Pancreatitis     Not sure     Encounter to establish care -     He sees cardiology - Dr. Lockwood and FLAKO Lopez in [place] - hx of MI x2 (2018 & 2020), hx of cardiac stents x6 - 1st heart cath he got 1, second one he got 2, and then in November of last year he had 4 stents. He has not had open heart surgery. No history of strokes. In November 2023 he was having a lot of issues, hence the heart cath, but no MI. He does take ASA and Plavix, and is prescribed 80mg atorvastatin daily. He states that cardiology had him on Brilinta, but PCP kept putting him on Plavix, so the heart MD took him off the Brilinta and left him on Plavix. No complaints of chest pain. He does get occasional palpitations. Blood pressure and heart rate are managed with lisinopril and metoprolol. He takes 20mg lisinopril daily, and metoprolol 25mg BID. He does not have trouble with headaches, dizziness, or SOB as a rule. He gets dizziness occasionally or SOB occasionally, but only with exertion.     He has type II DM - managed currently with Ozempic, Lantus, and Humalog - he has never seen endocrinology. He did get a referral from prior PCP but will not see them until October. Last night his blood sugar was 378. He has not checked it this AM. Ozempic is 2mg weekly. Lantus is 20 units nightly (just started in the past month). He doses Humalog by [facility] - it is written down at home. He has had an eye exam in the past year - [facility].  "    He has NATALIO - recently diagnosed and newly on a CPAP.     He takes Pepcid for reflux. He has taken this for years. He has never had an EGD. He denies any dysphagia, vomiting, abdominal pain. He has nausea intermittently. He has had pancreatitis 7 times over the past 2 years.     He sees pain management for his back - [facility] - he has been established with them for the past 2-3 months. Right now they are just addressing his back - severe DDD and loss at L5-S1, and degenerative facet arthropathy at L4-5, L5-S1. He states he has a lot of joint pain in general - there are days he cannot move his hands it so bad.     Objective   Vital Signs:   Vitals:    08/19/24 0913   BP: 124/76   Pulse: 108   Temp: 97.3 °F (36.3 °C)   TempSrc: Temporal   SpO2: 98%   Weight: 101 kg (223 lb)   Height: 182.9 cm (72\")   PainSc:   8     Body mass index is 30.24 kg/m².    Wt Readings from Last 3 Encounters:   08/19/24 101 kg (223 lb)   11/19/19 122 kg (270 lb)   03/28/19 118 kg (260 lb)     BP Readings from Last 3 Encounters:   08/19/24 124/76       Health Maintenance   Topic Date Due    URINE MICROALBUMIN  Never done    COLORECTAL CANCER SCREENING  Never done    Pneumococcal Vaccine 0-64 (1 of 2 - PCV) Never done    DIABETIC EYE EXAM  Never done    Hepatitis B (1 of 3 - 19+ 3-dose series) Never done    TDAP/TD VACCINES (1 - Tdap) Never done    ZOSTER VACCINE (1 of 2) Never done    LUNG CANCER SCREENING  Never done    LIPID PANEL  03/19/2022    HEPATITIS C SCREENING  Never done    HEMOGLOBIN A1C  07/21/2022    COVID-19 Vaccine (3 - 2023-24 season) 09/01/2023    INFLUENZA VACCINE  08/01/2024    ANNUAL PHYSICAL  04/23/2025    BMI FOLLOWUP  07/24/2025    DIABETIC FOOT EXAM  08/19/2025       Physical Exam  Vitals reviewed.   Constitutional:       General: He is not in acute distress.     Appearance: He is well-developed. He is obese. He is not ill-appearing.   HENT:      Head: Normocephalic and atraumatic.   Eyes:      " General: No scleral icterus.        Right eye: No discharge.         Left eye: No discharge.      Extraocular Movements: Extraocular movements intact.      Conjunctiva/sclera: Conjunctivae normal.   Neck:      Vascular: No carotid bruit.      Trachea: Trachea normal.   Cardiovascular:      Rate and Rhythm: Normal rate and regular rhythm.      Pulses: Normal pulses.           Dorsalis pedis pulses are 2+ on the right side and 2+ on the left side.        Posterior tibial pulses are 2+ on the right side and 2+ on the left side.      Heart sounds: No murmur heard.  Pulmonary:      Effort: Pulmonary effort is normal.      Breath sounds: Normal breath sounds. No wheezing, rhonchi or rales.   Musculoskeletal:         General: Normal range of motion.      Cervical back: Normal range of motion and neck supple. No tenderness.      Right lower leg: No edema.      Left lower leg: No edema.      Right foot: Normal range of motion. No deformity or bunion.      Left foot: Normal range of motion. No deformity or bunion.   Feet:      Right foot:      Protective Sensation: 9 sites tested.  9 sites sensed.      Skin integrity: Skin integrity normal. No ulcer or blister.      Toenail Condition: Right toenails are normal.      Left foot:      Protective Sensation: 9 sites tested.  9 sites sensed.      Skin integrity: Skin integrity normal. No ulcer or blister.      Toenail Condition: Left toenails are normal.      Comments: Diabetic Foot Exam Performed and Monofilament Test Performed     Lymphadenopathy:      Cervical: No cervical adenopathy.   Skin:     General: Skin is warm and dry.      Findings: Lesion (pedunculated lesion to the forehead on the left - there are also several papules noted on the scalp of unknown signficance) present.   Neurological:      Mental Status: He is alert and oriented to person, place, and time.      Gait: Gait abnormal (ambulatory with cane).   Psychiatric:         Mood and Affect: Mood and affect normal.          Behavior: Behavior normal.         Thought Content: Thought content normal.         Judgment: Judgment normal.         Result Review :  The following data was reviewed by: JOSE RAUL Leung on 08/19/2024:    04/19/2024 -  CBC: White count 8.4, hemoglobin 16.6, hematocrit 49.5, platelets 240    A1c: 10.6%    TSH: 1.85    Lipid profile: Total cholesterol 424, HDL cholesterol 24.6, triglycerides 3052, VLDL cholesterol 610    Vitamin D: 6    CMP: EGFR 100.28, glucose 301, sodium 129, creatinine 0.84, BUN 13, potassium 4.1, chloride 95, calcium 9.8, bilirubin total 0.4, alkaline phosphatase 72, AST 20, ALT 37    XR Spine Lumbar 2 or 3 View    Result Date: 6/6/2024  Impression: 1. Severe degenerative disc and loss at L5-S1 with anterior endplate osteophyte formation. 2. Degenerative facet arthropathy at L4-5 and L5-S1. Electronically Signed: Armando Woodson  6/6/2024 3:25 PM EDT  Workstation ID: [id number]      Procedures        Assessment and Plan   Diagnoses and all orders for this visit:    1. Encounter to establish care (Primary)    2. Vaccine counseling  Comments:  Age-appropriate: Prevnar 20, hepatitis B, Tdap, Shingrix, seasonal influenza.    3. Pneumococcal vaccination declined    4. Class 1 obesity with serious comorbidity and body mass index (BMI) of 30.0 to 30.9 in adult, unspecified obesity type    5. Type 2 diabetes mellitus with hyperglycemia, with long-term current use of insulin  -     CBC Auto Differential; Future  -     Comprehensive Metabolic Panel; Future  -     Hemoglobin A1c; Future  -     Lipid Panel; Future  -     TSH+Free T4; Future  -     Microalbumin / Creatinine Urine Ratio - Urine, Clean Catch; Future  -     Continuous Glucose Sensor (Dexcom G7 Sensor) misc; Use 1 each Continuous.  Dispense: 6 each; Refill: 1  -     Continuous Glucose  (Dexcom G7 ) device; Use 1 each Continuous.  Dispense: 1 each; Refill: 0  -     OneTouch Verio test strip; 1 each by Other route 4  (Four) Times a Day Before Meals & at Bedtime. Use as instructed  Dispense: 400 each; Refill: 3    6. Encounter for diabetic foot exam    7. Essential hypertension    8. Stented coronary artery    9. History of myocardial infarction    10. Hyperlipidemia, unspecified hyperlipidemia type  -     C-reactive protein; Future  -     Lipoprotein A (LPA); Future  -     Apolipoprotein B; Future    11. Gastroesophageal reflux disease, unspecified whether esophagitis present  -     Ambulatory Referral to Gastroenterology    12. Colon cancer screening  -     Ambulatory Referral to Gastroenterology    13. History of pancreatitis  -     Ambulatory Referral to Gastroenterology    14. Prostate cancer screening  -     PSA Screen; Future    15. Chronic joint pain  -     Cyclic Citrul Peptide Antibody, IgG / IgA; Future  -     Uric Acid; Future  -     Antistreptolysin O Titer; Future  -     Rheumatoid Factor; Future  -     Sedimentation Rate; Future  -     CK; Future  -     TUNDE by IFA, Reflex 9-biomarkers profile; Future  -     C-reactive protein; Future    16. Lumbar degenerative disc disease    17. Vitamin D deficiency  -     Vitamin D,25-Hydroxy; Future    18. Encounter for screening for malignant neoplasm of skin  -     Ambulatory Referral to Dermatology    19. Need for hepatitis C screening test  -     Hepatitis C Antibody; Future    20. Screening for lung cancer  -      CT Chest Low Dose Cancer Screening WO; Future    21. Cigarette nicotine dependence with nicotine-induced disorder  -      CT Chest Low Dose Cancer Screening WO; Future        BMI is >= 30 and <35. (Class 1 Obesity). The following options were offered after discussion;: weight loss educational material (shared in after visit summary)         FOLLOW UP  Return in about 1 week (around 8/26/2024) for Next scheduled follow up.    His last labs were in April of this year and were significantly abnormal.  He is going to get fasting labs tomorrow and follow-up with me  next week to discuss.  I am going to attempt to get him a continuous glucose meter, but in the interim we will refill his test strips for glucose monitoring.  Last A1c was 10.9% in April and he is still running high on his blood glucose.    He endorses chronic joint pain which has not been evaluated previously.  I will get rheumatoid profile to further assess.  Cardiology does not want him taking NSAIDs secondary to his cardiovascular history.  He recently establish care with pain management and is currently prescribed Percocet.  Continues to endorse pain despite medication.  Consider rheumatology referral if labs are abnormal.    We will also work on getting him up-to-date regarding health maintenance.  He reportedly had a wellness exam in April; however, he has not had colorectal cancer screening, he has not had vaccination update, no referral for CT of the chest for lung cancer screening.    Patient was given instructions and counseling regarding his condition or for health maintenance advice. Please see specific information pulled into the AVS if appropriate.       Ann Baker, APRN  08/19/24  10:18 EDT    CURRENT & DISCONTINUED MEDICATIONS  Current Outpatient Medications   Medication Instructions    aspirin 81 MG EC tablet 1 tablet, Oral, Daily    atorvastatin (LIPITOR) 80 MG tablet 1 tablet, Oral, Daily    BD Pen Needle Bharati 2nd Gen 32G X 4 MM misc See Admin Instructions, with insulin    clopidogrel (PLAVIX) 75 mg, Oral, Daily    Continuous Glucose  (Dexcom G7 ) device 1 each, Does not apply, Continuous    Continuous Glucose Sensor (Dexcom G7 Sensor) misc 1 each, Does not apply, Continuous    famotidine (PEPCID) 40 MG tablet 1 tablet, Oral, Daily    Insulin Glargine (BASAGLAR KWIKPEN) 100 UNIT/ML injection pen Basaglar KwikPen U-100 Insulin 100 unit/mL (3 mL) subcutaneous insulin pen inject by subcutaneous route as per insulin protocol   Active    Insulin Lispro, 1 Unit Dial, (HUMALOG)  100 UNIT/ML solution pen-injector INJECT 10 UNITS UNDER THE SKIN WITH MEALS    Lantus SoloStar 100 UNIT/ML injection pen ADMINISTER 20 UNITS UNDER THE SKIN EVERY DAY IN THE EVENING    lisinopril (PRINIVIL,ZESTRIL) 20 MG tablet 1 tablet, Oral, Daily    metoprolol tartrate (LOPRESSOR) 25 MG tablet 1 tablet, Oral, 2 Times Daily    Narcan 4 MG/0.1ML nasal spray Take 1 spray by nasal route as directed.    nitroglycerin (NITROSTAT) 0.4 MG SL tablet     OneTouch Verio test strip 1 each, Other, 4 Times Daily Before Meals & Nightly, Use as instructed    oxyCODONE-acetaminophen (PERCOCET) 5-325 MG per tablet 1 tablet, Oral    Ozempic, 2 MG/DOSE, 8 MG/3ML solution pen-injector INJECT 2 MG SUBCUTANEOUS ONCE A WEEKY    triamcinolone (KENALOG) 0.1 % cream APPLY A THIN LAYER TO AFFECTED AREA(S) TWICE DAILY    vitamin D (ERGOCALCIFEROL) 1.25 MG (92738 UT) capsule capsule 1 capsule, Oral, Weekly       Medications Discontinued During This Encounter   Medication Reason    diclofenac (VOLTAREN) 75 MG EC tablet Patient Discharge    Fenofibrate (LIPOFEN) 150 MG capsule Patient Discharge    Magnesium Oxide -Mg Supplement 500 MG tablet Patient Discharge    Metoprolol-HCTZ -12.5 MG tablet sustained-release 24 hour Patient Discharge    pantoprazole (PROTONIX) 40 MG EC tablet Patient Discharge    Pseudoephedrine-Acetaminophen (SM NON-ASPRIN SINUS PO) Patient Discharge    Brilinta 90 MG tablet tablet Patient Discharge    metoprolol succinate XL (TOPROL-XL) 25 MG 24 hr tablet Duplicate order

## 2024-08-20 ENCOUNTER — CLINICAL SUPPORT (OUTPATIENT)
Dept: FAMILY MEDICINE CLINIC | Facility: CLINIC | Age: 54
End: 2024-08-20
Payer: COMMERCIAL

## 2024-08-20 DIAGNOSIS — G89.29 CHRONIC JOINT PAIN: ICD-10-CM

## 2024-08-20 DIAGNOSIS — E11.65 TYPE 2 DIABETES MELLITUS WITH HYPERGLYCEMIA, WITH LONG-TERM CURRENT USE OF INSULIN: ICD-10-CM

## 2024-08-20 DIAGNOSIS — E78.5 HYPERLIPIDEMIA, UNSPECIFIED HYPERLIPIDEMIA TYPE: ICD-10-CM

## 2024-08-20 DIAGNOSIS — Z12.5 PROSTATE CANCER SCREENING: ICD-10-CM

## 2024-08-20 DIAGNOSIS — M25.50 CHRONIC JOINT PAIN: ICD-10-CM

## 2024-08-20 DIAGNOSIS — E55.9 VITAMIN D DEFICIENCY: ICD-10-CM

## 2024-08-20 DIAGNOSIS — Z11.59 NEED FOR HEPATITIS C SCREENING TEST: ICD-10-CM

## 2024-08-20 DIAGNOSIS — Z79.4 TYPE 2 DIABETES MELLITUS WITH HYPERGLYCEMIA, WITH LONG-TERM CURRENT USE OF INSULIN: ICD-10-CM

## 2024-08-20 LAB
25(OH)D3 SERPL-MCNC: 17.7 NG/ML (ref 30–100)
ALBUMIN SERPL-MCNC: 4.5 G/DL (ref 3.5–5.2)
ALBUMIN UR-MCNC: <1.2 MG/DL
ALBUMIN/GLOB SERPL: 1.4 G/DL
ALP SERPL-CCNC: 74 U/L (ref 39–117)
ALT SERPL W P-5'-P-CCNC: 41 U/L (ref 1–41)
ANION GAP SERPL CALCULATED.3IONS-SCNC: 12.7 MMOL/L (ref 5–15)
ARTICHOKE IGE QN: 75 MG/DL (ref 0–100)
AST SERPL-CCNC: 22 U/L (ref 1–40)
BASOPHILS # BLD AUTO: 0.1 10*3/MM3 (ref 0–0.2)
BASOPHILS NFR BLD AUTO: 0.9 % (ref 0–1.5)
BILIRUB SERPL-MCNC: 0.3 MG/DL (ref 0–1.2)
BUN SERPL-MCNC: 13 MG/DL (ref 6–20)
BUN/CREAT SERPL: 17.6 (ref 7–25)
CALCIUM SPEC-SCNC: 10.3 MG/DL (ref 8.6–10.5)
CHLORIDE SERPL-SCNC: 96 MMOL/L (ref 98–107)
CHOLEST SERPL-MCNC: 227 MG/DL (ref 0–200)
CHROMATIN AB SERPL-ACNC: <10 IU/ML (ref 0–14)
CK SERPL-CCNC: 33 U/L (ref 20–200)
CO2 SERPL-SCNC: 22.3 MMOL/L (ref 22–29)
CREAT SERPL-MCNC: 0.74 MG/DL (ref 0.76–1.27)
CREAT UR-MCNC: 95.1 MG/DL
CRP SERPL-MCNC: <0.3 MG/DL (ref 0–0.5)
DEPRECATED RDW RBC AUTO: 47.3 FL (ref 37–54)
EGFRCR SERPLBLD CKD-EPI 2021: 108.3 ML/MIN/1.73
EOSINOPHIL # BLD AUTO: 0.13 10*3/MM3 (ref 0–0.4)
EOSINOPHIL NFR BLD AUTO: 1.2 % (ref 0.3–6.2)
ERYTHROCYTE [DISTWIDTH] IN BLOOD BY AUTOMATED COUNT: 15.3 % (ref 12.3–15.4)
ERYTHROCYTE [SEDIMENTATION RATE] IN BLOOD: 14 MM/HR (ref 0–20)
GLOBULIN UR ELPH-MCNC: 3.2 GM/DL
GLUCOSE SERPL-MCNC: 245 MG/DL (ref 65–99)
HBA1C MFR BLD: 9.8 % (ref 4.8–5.6)
HCT VFR BLD AUTO: 49.4 % (ref 37.5–51)
HCV AB SER QL: NORMAL
HDLC SERPL-MCNC: 29 MG/DL (ref 40–60)
HGB BLD-MCNC: 16.6 G/DL (ref 13–17.7)
IMM GRANULOCYTES # BLD AUTO: 0.04 10*3/MM3 (ref 0–0.05)
IMM GRANULOCYTES NFR BLD AUTO: 0.4 % (ref 0–0.5)
LDLC SERPL CALC-MCNC: ABNORMAL MG/DL
LDLC/HDLC SERPL: ABNORMAL {RATIO}
LYMPHOCYTES # BLD AUTO: 2.51 10*3/MM3 (ref 0.7–3.1)
LYMPHOCYTES NFR BLD AUTO: 23.7 % (ref 19.6–45.3)
MCH RBC QN AUTO: 28.9 PG (ref 26.6–33)
MCHC RBC AUTO-ENTMCNC: 33.6 G/DL (ref 31.5–35.7)
MCV RBC AUTO: 86.1 FL (ref 79–97)
MICROALBUMIN/CREAT UR: NORMAL MG/G{CREAT}
MONOCYTES # BLD AUTO: 0.86 10*3/MM3 (ref 0.1–0.9)
MONOCYTES NFR BLD AUTO: 8.1 % (ref 5–12)
NEUTROPHILS NFR BLD AUTO: 6.96 10*3/MM3 (ref 1.7–7)
NEUTROPHILS NFR BLD AUTO: 65.7 % (ref 42.7–76)
NRBC BLD AUTO-RTO: 0 /100 WBC (ref 0–0.2)
PLATELET # BLD AUTO: 240 10*3/MM3 (ref 140–450)
PMV BLD AUTO: 10 FL (ref 6–12)
POTASSIUM SERPL-SCNC: 4.2 MMOL/L (ref 3.5–5.2)
PROT SERPL-MCNC: 7.7 G/DL (ref 6–8.5)
PSA SERPL-MCNC: 1.07 NG/ML (ref 0–4)
RBC # BLD AUTO: 5.74 10*6/MM3 (ref 4.14–5.8)
SODIUM SERPL-SCNC: 131 MMOL/L (ref 136–145)
T4 FREE SERPL-MCNC: 1.17 NG/DL (ref 0.92–1.68)
TRIGL SERPL-MCNC: 1003 MG/DL (ref 0–150)
TSH SERPL DL<=0.05 MIU/L-ACNC: 1.44 UIU/ML (ref 0.27–4.2)
URATE SERPL-MCNC: 3.2 MG/DL (ref 3.4–7)
VLDLC SERPL-MCNC: ABNORMAL MG/DL
WBC NRBC COR # BLD AUTO: 10.6 10*3/MM3 (ref 3.4–10.8)

## 2024-08-20 PROCEDURE — 85652 RBC SED RATE AUTOMATED: CPT | Performed by: NURSE PRACTITIONER

## 2024-08-20 PROCEDURE — 83695 ASSAY OF LIPOPROTEIN(A): CPT | Performed by: NURSE PRACTITIONER

## 2024-08-20 PROCEDURE — 82043 UR ALBUMIN QUANTITATIVE: CPT | Performed by: NURSE PRACTITIONER

## 2024-08-20 PROCEDURE — 84443 ASSAY THYROID STIM HORMONE: CPT | Performed by: NURSE PRACTITIONER

## 2024-08-20 PROCEDURE — 86431 RHEUMATOID FACTOR QUANT: CPT | Performed by: NURSE PRACTITIONER

## 2024-08-20 PROCEDURE — 82306 VITAMIN D 25 HYDROXY: CPT | Performed by: NURSE PRACTITIONER

## 2024-08-20 PROCEDURE — 83036 HEMOGLOBIN GLYCOSYLATED A1C: CPT | Performed by: NURSE PRACTITIONER

## 2024-08-20 PROCEDURE — 82570 ASSAY OF URINE CREATININE: CPT | Performed by: NURSE PRACTITIONER

## 2024-08-20 PROCEDURE — 86200 CCP ANTIBODY: CPT | Performed by: NURSE PRACTITIONER

## 2024-08-20 PROCEDURE — 82550 ASSAY OF CK (CPK): CPT | Performed by: NURSE PRACTITIONER

## 2024-08-20 PROCEDURE — 86060 ANTISTREPTOLYSIN O TITER: CPT | Performed by: NURSE PRACTITIONER

## 2024-08-20 PROCEDURE — 80053 COMPREHEN METABOLIC PANEL: CPT | Performed by: NURSE PRACTITIONER

## 2024-08-20 PROCEDURE — 86038 ANTINUCLEAR ANTIBODIES: CPT | Performed by: NURSE PRACTITIONER

## 2024-08-20 PROCEDURE — 85025 COMPLETE CBC W/AUTO DIFF WBC: CPT | Performed by: NURSE PRACTITIONER

## 2024-08-20 PROCEDURE — 82172 ASSAY OF APOLIPOPROTEIN: CPT | Performed by: NURSE PRACTITIONER

## 2024-08-20 PROCEDURE — 84439 ASSAY OF FREE THYROXINE: CPT | Performed by: NURSE PRACTITIONER

## 2024-08-20 PROCEDURE — 86803 HEPATITIS C AB TEST: CPT | Performed by: NURSE PRACTITIONER

## 2024-08-20 PROCEDURE — 36415 COLL VENOUS BLD VENIPUNCTURE: CPT | Performed by: NURSE PRACTITIONER

## 2024-08-20 PROCEDURE — 80061 LIPID PANEL: CPT | Performed by: NURSE PRACTITIONER

## 2024-08-20 PROCEDURE — 84550 ASSAY OF BLOOD/URIC ACID: CPT | Performed by: NURSE PRACTITIONER

## 2024-08-20 PROCEDURE — G0103 PSA SCREENING: HCPCS | Performed by: NURSE PRACTITIONER

## 2024-08-20 PROCEDURE — 86140 C-REACTIVE PROTEIN: CPT | Performed by: NURSE PRACTITIONER

## 2024-08-20 PROCEDURE — 83721 ASSAY OF BLOOD LIPOPROTEIN: CPT | Performed by: NURSE PRACTITIONER

## 2024-08-20 NOTE — PROGRESS NOTES
..  Venipuncture Blood Specimen Collection  Venipuncture performed in LT arm by Lluvia Kelsey MA with good hemostasis. Patient tolerated the procedure well without complications.   08/20/24   Lluvia Kelsey MA

## 2024-08-21 LAB
ASO AB SERPL-ACNC: 114.4 IU/ML (ref 0–200)
CCP IGA+IGG SERPL IA-ACNC: 4 UNITS (ref 0–19)
LPA SERPL-SCNC: 9.2 NMOL/L

## 2024-08-22 LAB
ANA SER QL IF: NEGATIVE
APO B SERPL-MCNC: 112 MG/DL
LABORATORY COMMENT REPORT: NORMAL

## 2024-08-26 ENCOUNTER — OFFICE VISIT (OUTPATIENT)
Dept: FAMILY MEDICINE CLINIC | Facility: CLINIC | Age: 54
End: 2024-08-26
Payer: COMMERCIAL

## 2024-08-26 VITALS
OXYGEN SATURATION: 97 % | SYSTOLIC BLOOD PRESSURE: 124 MMHG | BODY MASS INDEX: 30.38 KG/M2 | DIASTOLIC BLOOD PRESSURE: 70 MMHG | TEMPERATURE: 97.8 F | WEIGHT: 224 LBS | HEART RATE: 88 BPM

## 2024-08-26 DIAGNOSIS — M54.50 CHRONIC BILATERAL LOW BACK PAIN WITHOUT SCIATICA: Primary | ICD-10-CM

## 2024-08-26 DIAGNOSIS — E78.1 HYPERTRIGLYCERIDEMIA: ICD-10-CM

## 2024-08-26 DIAGNOSIS — E55.9 VITAMIN D DEFICIENCY: ICD-10-CM

## 2024-08-26 DIAGNOSIS — E78.2 MIXED HYPERLIPIDEMIA: ICD-10-CM

## 2024-08-26 DIAGNOSIS — E11.65 TYPE 2 DIABETES MELLITUS WITH HYPERGLYCEMIA, WITH LONG-TERM CURRENT USE OF INSULIN: ICD-10-CM

## 2024-08-26 DIAGNOSIS — Z79.4 TYPE 2 DIABETES MELLITUS WITH HYPERGLYCEMIA, WITH LONG-TERM CURRENT USE OF INSULIN: ICD-10-CM

## 2024-08-26 DIAGNOSIS — M47.816 SPONDYLOSIS OF LUMBAR SPINE: ICD-10-CM

## 2024-08-26 DIAGNOSIS — G89.29 CHRONIC BILATERAL LOW BACK PAIN WITHOUT SCIATICA: Primary | ICD-10-CM

## 2024-08-26 PROCEDURE — 3046F HEMOGLOBIN A1C LEVEL >9.0%: CPT | Performed by: NURSE PRACTITIONER

## 2024-08-26 PROCEDURE — 1125F AMNT PAIN NOTED PAIN PRSNT: CPT | Performed by: NURSE PRACTITIONER

## 2024-08-26 PROCEDURE — 3078F DIAST BP <80 MM HG: CPT | Performed by: NURSE PRACTITIONER

## 2024-08-26 PROCEDURE — 1160F RVW MEDS BY RX/DR IN RCRD: CPT | Performed by: NURSE PRACTITIONER

## 2024-08-26 PROCEDURE — 3074F SYST BP LT 130 MM HG: CPT | Performed by: NURSE PRACTITIONER

## 2024-08-26 PROCEDURE — 99214 OFFICE O/P EST MOD 30 MIN: CPT | Performed by: NURSE PRACTITIONER

## 2024-08-26 PROCEDURE — 1159F MED LIST DOCD IN RCRD: CPT | Performed by: NURSE PRACTITIONER

## 2024-08-26 PROCEDURE — 81374 HLA I TYPING 1 ANTIGEN LR: CPT | Performed by: NURSE PRACTITIONER

## 2024-08-26 RX ORDER — INSULIN GLARGINE 100 [IU]/ML
22 INJECTION, SOLUTION SUBCUTANEOUS NIGHTLY
Qty: 15 ML | Refills: 0 | Status: SHIPPED | OUTPATIENT
Start: 2024-08-26

## 2024-08-26 RX ORDER — OMEGA-3/DHA/EPA/FISH OIL 360-1200MG
1000 CAPSULE,DELAYED RELEASE (ENTERIC COATED) ORAL DAILY
Qty: 90 CAPSULE | Refills: 0 | Status: SHIPPED | OUTPATIENT
Start: 2024-08-26

## 2024-08-26 RX ORDER — FENOFIBRATE 145 MG/1
145 TABLET, COATED ORAL DAILY
Qty: 90 TABLET | Refills: 0 | Status: SHIPPED | OUTPATIENT
Start: 2024-08-26

## 2024-08-26 NOTE — PROGRESS NOTES
Chief Complaint  Diabetes (Follow up on labs)    History of Present Illness  Manuel Demarco is a 53 y.o. male who presents to [facility] with a past medical history of    Past Medical History:   Diagnosis Date    Allergic 2011    Arthritis 2000    Coronary artery disease 2018    Diabetes mellitus     Not sure    GERD (gastroesophageal reflux disease)     Hyperlipidemia     Hypertension     Low back pain     Myocardial infarction 2018    Heart attack feb 2018 and again may 2020    Pancreatitis     Not sure     Mr. Mayo presents to the office today to follow-up on recent labs.    He had labs on 8/20/2024.      CBC was normal.      CMP showed glucose of 245 at the time of labs.  Mild hyponatremia with sodium of 131.  Chloride 96.  Renal function was normal.    Hemoglobin A1c down to 9.8% from previous.  He is currently awaiting an appointment with endocrinology. He brought his SSI with him today (recorded). Also using Ozempic weekly, and Lantus nightly, but only 20 units currently.     Vitamin D was low at 17.7.  He was prescribed vitamin D 50,000 units weekly.    Apolipoprotein B came back elevated at 112.  Lipoprotein a normal.  Triglycerides improved from previous, but still greater than 1000.  Total cholesterol 227.  LDL cholesterol calculated at 75. He is currently taking 80mg atorvastatin     Labs for inflammatory arthritis were negative including TUNDE, rheumatoid factor, C-reactive protein, CK, CCP antibody, antistreptolysin O titer, sed rate, and C-reactive protein.    Objective   Vital Signs:   Vitals:    08/26/24 1031   BP: 124/70   Pulse: 88   Temp: 97.8 °F (36.6 °C)   TempSrc: Temporal   SpO2: 97%   Weight: 102 kg (224 lb)     Body mass index is 30.38 kg/m².    Wt Readings from Last 3 Encounters:   08/26/24 102 kg (224 lb)   08/19/24 101 kg (223 lb)   11/19/19 122 kg (270 lb)     BP Readings from Last 3 Encounters:   08/26/24 124/70   08/19/24 124/76       Health  Maintenance   Topic Date Due    COLORECTAL CANCER SCREENING  Never done    Pneumococcal Vaccine 0-64 (1 of 2 - PCV) Never done    Hepatitis B (1 of 3 - 19+ 3-dose series) Never done    TDAP/TD VACCINES (1 - Tdap) Never done    ZOSTER VACCINE (1 of 2) Never done    LUNG CANCER SCREENING  Never done    COVID-19 Vaccine (3 - 2023-24 season) 09/01/2023    INFLUENZA VACCINE  08/01/2024    HEMOGLOBIN A1C  02/20/2025    DIABETIC EYE EXAM  04/04/2025    ANNUAL PHYSICAL  04/23/2025    DIABETIC FOOT EXAM  08/19/2025    BMI FOLLOWUP  08/19/2025    LIPID PANEL  08/20/2025    URINE MICROALBUMIN  08/20/2025    HEPATITIS C SCREENING  Completed       Physical Exam  Vitals reviewed.   Constitutional:       General: He is not in acute distress.     Appearance: He is well-developed. He is obese. He is not ill-appearing.   HENT:      Head: Normocephalic and atraumatic.   Eyes:      General: No scleral icterus.        Right eye: No discharge.         Left eye: No discharge.      Extraocular Movements: Extraocular movements intact.      Conjunctiva/sclera: Conjunctivae normal.   Cardiovascular:      Rate and Rhythm: Normal rate and regular rhythm.      Pulses: Normal pulses.      Heart sounds: No murmur heard.  Pulmonary:      Effort: Pulmonary effort is normal.      Breath sounds: Normal breath sounds. No wheezing, rhonchi or rales.   Musculoskeletal:         General: Normal range of motion.      Right lower leg: No edema.      Left lower leg: No edema.   Skin:     General: Skin is warm and dry.   Neurological:      Mental Status: He is alert and oriented to person, place, and time.      Gait: Gait abnormal (Ambulatory with cane).   Psychiatric:         Mood and Affect: Mood and affect normal.         Behavior: Behavior normal.         Thought Content: Thought content normal.         Judgment: Judgment normal.         Result Review :  The following data was reviewed by: JOSE RAUL Leung on 08/26/2024:    Clinical Support on  08/20/2024   Component Date Value    WBC 08/20/2024 10.60     RBC 08/20/2024 5.74     Hemoglobin 08/20/2024 16.6     Hematocrit 08/20/2024 49.4     MCV 08/20/2024 86.1     MCH 08/20/2024 28.9     MCHC 08/20/2024 33.6     RDW 08/20/2024 15.3     RDW-SD 08/20/2024 47.3     MPV 08/20/2024 10.0     Platelets 08/20/2024 240     Neutrophil % 08/20/2024 65.7     Lymphocyte % 08/20/2024 23.7     Monocyte % 08/20/2024 8.1     Eosinophil % 08/20/2024 1.2     Basophil % 08/20/2024 0.9     Immature Grans % 08/20/2024 0.4     Neutrophils, Absolute 08/20/2024 6.96     Lymphocytes, Absolute 08/20/2024 2.51     Monocytes, Absolute 08/20/2024 0.86     Eosinophils, Absolute 08/20/2024 0.13     Basophils, Absolute 08/20/2024 0.10     Immature Grans, Absolute 08/20/2024 0.04     nRBC 08/20/2024 0.0     Glucose 08/20/2024 245 (H)     BUN 08/20/2024 13     Creatinine 08/20/2024 0.74 (L)     Sodium 08/20/2024 131 (L)     Potassium 08/20/2024 4.2     Chloride 08/20/2024 96 (L)     CO2 08/20/2024 22.3     Calcium 08/20/2024 10.3     Total Protein 08/20/2024 7.7     Albumin 08/20/2024 4.5     ALT (SGPT) 08/20/2024 41     AST (SGOT) 08/20/2024 22     Alkaline Phosphatase 08/20/2024 74     Total Bilirubin 08/20/2024 0.3     Globulin 08/20/2024 3.2     A/G Ratio 08/20/2024 1.4     BUN/Creatinine Ratio 08/20/2024 17.6     Anion Gap 08/20/2024 12.7     eGFR 08/20/2024 108.3     Hemoglobin A1C 08/20/2024 9.80 (H)     Total Cholesterol 08/20/2024 227 (H)     Triglycerides 08/20/2024 1,003 (H)     HDL Cholesterol 08/20/2024 29 (L)     LDL Cholesterol  08/20/2024      VLDL Cholesterol 08/20/2024      LDL/HDL Ratio 08/20/2024      TSH 08/20/2024 1.440     Free T4 08/20/2024 1.17     PSA 08/20/2024 1.070     25 Hydroxy, Vitamin D 08/20/2024 17.7 (L)     CCP Antibodies IgG/IgA 08/20/2024 4     Uric Acid 08/20/2024 3.2 (L)     ASO 08/20/2024 114.4     Rheumatoid Factor Quanti* 08/20/2024 <10.0     Sed Rate 08/20/2024 14     Creatine Kinase 08/20/2024 33      TUNDE 08/20/2024 Negative     Please note 08/20/2024 Comment     C-Reactive Protein 08/20/2024 <0.30     Lipoprotein (a) 08/20/2024 9.2     Apolipoprotein B 08/20/2024 112 (H)     Hepatitis C Ab 08/20/2024 Non-Reactive     Microalbumin/Creatinine * 08/20/2024      Creatinine, Urine 08/20/2024 95.1     Microalbumin, Urine 08/20/2024 <1.2     LDL Cholesterol  08/20/2024 75      XR Spine Lumbar 2 or 3 View    Result Date: 6/6/2024  Impression: 1. Severe degenerative disc and loss at L5-S1 with anterior endplate osteophyte formation. 2. Degenerative facet arthropathy at L4-5 and L5-S1. Electronically Signed: Armando Woodson  6/6/2024 3:25 PM EDT  Workstation ID: [id number]      Procedures        Assessment and Plan   Diagnoses and all orders for this visit:    1. Chronic bilateral low back pain without sciatica (Primary)  -     HLA-B27 Antigen    2. Type 2 diabetes mellitus with hyperglycemia, with long-term current use of insulin  -     Lantus SoloStar 100 UNIT/ML injection pen; Inject 22 Units under the skin into the appropriate area as directed Every Night. Increase by 2 units every 3 days until fasting blood sugar is less than 140. Not to exceed 50 units per day.  Dispense: 15 mL; Refill: 0    3. Mixed hyperlipidemia  -     fenofibrate (Tricor) 145 MG tablet; Take 1 tablet by mouth Daily.  Dispense: 90 tablet; Refill: 0  -     Omega-3 Fatty Acids (EQL Omega 3 Fish Oil) 1000 MG capsule delayed-release; Take 1,000 mg by mouth Daily.  Dispense: 90 capsule; Refill: 0  -     Lipid Panel; Future  -     Comprehensive Metabolic Panel; Future    4. Hypertriglyceridemia  -     fenofibrate (Tricor) 145 MG tablet; Take 1 tablet by mouth Daily.  Dispense: 90 tablet; Refill: 0  -     Omega-3 Fatty Acids (EQL Omega 3 Fish Oil) 1000 MG capsule delayed-release; Take 1,000 mg by mouth Daily.  Dispense: 90 capsule; Refill: 0  -     Lipid Panel; Future  -     Comprehensive Metabolic Panel; Future    5. Spondylosis of lumbar spine  -      HLA-B27 Antigen    6. Vitamin D deficiency  -     Vitamin D,25-Hydroxy; Future               FOLLOW UP  Return in about 2 weeks (around 9/9/2024) for Next scheduled follow up.    Inflammatory arthritis panel essentially unremarkable.  He just recently started seeing pain management for chronic back pain and spondylosis of the lumbar spine.  I will check HLA-B27 antigen to assess for ankylosing spondylitis.  Patient advised that if this is abnormal we will refer to rheumatology.    In regards to his diabetes, he is currently injecting Lantus 20 units nightly and Ozempic 2 mg weekly.  He is using sliding scale insulin as noted, but reports that he has been giving himself his insulin after he eats and not before he eats.  Advised patient that I want him to use his continuous glucose meter to start checking his blood sugar Premeal and 2 hours postprandial (after meal) and recording.  Patient advised that this information can help us better adjust his mealtime insulin.  He will follow-up with me in 2 weeks with this information.  I also want him to increase his Lantus by 2 units every 3 days until his fasting blood sugar is less than 140, but not to exceed 50 units/day.  A1c is improved from when it was last checked with prior PCP, but he will not see endocrinology until October.    In regards to his mixed hyperlipidemia, with significant elevation in triglycerides, he is already on high intensity statin therapy.  I will add fenofibrate and omega-3 fatty acids.  Lipoprotein a was normal but apolipoprotein B was elevated.  He has already had previous cardiovascular events and stenting.  If this combination is not efficacious after 3 to 6 months of therapy we will consider Repatha at that time.    Patient was given instructions and counseling regarding his condition or for health maintenance advice. Please see specific information pulled into the AVS if appropriate.       Ann Baker, APRN  08/26/24  11:39  EDT    CURRENT & DISCONTINUED MEDICATIONS  Current Outpatient Medications   Medication Instructions    aspirin 81 MG EC tablet 1 tablet, Oral, Daily    atorvastatin (LIPITOR) 80 MG tablet 1 tablet, Oral, Daily    BD Pen Needle Bharati 2nd Gen 32G X 4 MM misc See Admin Instructions, with insulin    clopidogrel (PLAVIX) 75 mg, Oral, Daily    Continuous Glucose  (Dexcom G7 ) device 1 each, Does not apply, Continuous    Continuous Glucose Sensor (Dexcom G7 Sensor) misc 1 each, Does not apply, Continuous    EQL Omega 3 Fish Oil 1,000 mg, Oral, Daily    famotidine (PEPCID) 40 MG tablet 1 tablet, Oral, Daily    fenofibrate (TRICOR) 145 mg, Oral, Daily    Insulin Lispro, 1 Unit Dial, (HUMALOG) 100 UNIT/ML solution pen-injector SSI - 4 units (141-180), 6 units (181-220), 8 units (221-260), 10 units (261-300), 12 units (301-350), 14 units (351-400), 16 units (401-450)    Lantus SoloStar 22 Units, Subcutaneous, Nightly, Increase by 2 units every 3 days until fasting blood sugar is less than 140. Not to exceed 50 units per day.    lisinopril (PRINIVIL,ZESTRIL) 20 MG tablet 1 tablet, Oral, Daily    metoprolol tartrate (LOPRESSOR) 25 MG tablet 1 tablet, Oral, 2 Times Daily    Narcan 4 MG/0.1ML nasal spray Take 1 spray by nasal route as directed.    nitroglycerin (NITROSTAT) 0.4 MG SL tablet     OneTouch Verio test strip 1 each, Other, 4 Times Daily Before Meals & Nightly, Use as instructed    oxyCODONE-acetaminophen (PERCOCET) 5-325 MG per tablet 1 tablet, Oral    Ozempic, 2 MG/DOSE, 8 MG/3ML solution pen-injector INJECT 2 MG SUBCUTANEOUS ONCE A WEEKY    triamcinolone (KENALOG) 0.1 % cream APPLY A THIN LAYER TO AFFECTED AREA(S) TWICE DAILY    vitamin D (ERGOCALCIFEROL) 1.25 MG (88659 UT) capsule capsule 1 capsule, Oral, Weekly       Medications Discontinued During This Encounter   Medication Reason    Insulin Glargine (BASAGLAR KWIKPEN) 100 UNIT/ML injection pen *Therapy completed    Lantus SoloStar 100 UNIT/ML  injection pen Reorder

## 2024-08-26 NOTE — PROGRESS NOTES
Venipuncture Blood Specimen Collection  Venipuncture performed in left arm by Francisco J Borrero with good hemostasis. Patient tolerated the procedure well without complications.   08/26/24   Francisco J Borrero

## 2024-09-03 LAB — HLA-B27 QL NAA+PROBE: NEGATIVE

## 2024-09-04 ENCOUNTER — HOSPITAL ENCOUNTER (OUTPATIENT)
Dept: CT IMAGING | Facility: HOSPITAL | Age: 54
Discharge: HOME | End: 2024-09-04
Admitting: NURSE PRACTITIONER
Payer: COMMERCIAL

## 2024-09-04 DIAGNOSIS — F17.219 CIGARETTE NICOTINE DEPENDENCE WITH NICOTINE-INDUCED DISORDER: ICD-10-CM

## 2024-09-04 DIAGNOSIS — Z12.2 SCREENING FOR LUNG CANCER: ICD-10-CM

## 2024-09-04 PROCEDURE — 71271 CT THORAX LUNG CANCER SCR C-: CPT

## 2024-09-05 DIAGNOSIS — I10 ESSENTIAL HYPERTENSION: ICD-10-CM

## 2024-09-05 DIAGNOSIS — Z95.5 STENTED CORONARY ARTERY: ICD-10-CM

## 2024-09-05 DIAGNOSIS — E55.9 VITAMIN D DEFICIENCY: ICD-10-CM

## 2024-09-05 DIAGNOSIS — E78.2 MIXED HYPERLIPIDEMIA: Primary | ICD-10-CM

## 2024-09-05 RX ORDER — LISINOPRIL 20 MG/1
20 TABLET ORAL DAILY
Qty: 90 TABLET | Refills: 0 | Status: SHIPPED | OUTPATIENT
Start: 2024-09-05

## 2024-09-05 RX ORDER — ASPIRIN 81 MG/1
81 TABLET ORAL DAILY
Qty: 90 TABLET | Refills: 3 | Status: SHIPPED | OUTPATIENT
Start: 2024-09-05

## 2024-09-05 RX ORDER — ERGOCALCIFEROL 1.25 MG/1
50000 CAPSULE, LIQUID FILLED ORAL WEEKLY
Qty: 13 CAPSULE | Refills: 0 | Status: SHIPPED | OUTPATIENT
Start: 2024-09-05

## 2024-09-05 RX ORDER — ATORVASTATIN CALCIUM 80 MG/1
80 TABLET, FILM COATED ORAL DAILY
Qty: 90 TABLET | Refills: 0 | Status: SHIPPED | OUTPATIENT
Start: 2024-09-05

## 2024-09-05 NOTE — TELEPHONE ENCOUNTER
Caller: Manuel Demarco    Relationship: Self    Best call back number: [phone]    Requested Prescriptions:   Requested Prescriptions     Pending Prescriptions Disp Refills    vitamin D (ERGOCALCIFEROL) 1.25 MG (44251 UT) capsule capsule 5 capsule      Sig: Take 1 capsule by mouth 1 (One) Time Per Week.    atorvastatin (LIPITOR) 80 MG tablet 90 tablet      Sig: Take 1 tablet by mouth Daily.    aspirin 81 MG EC tablet       Sig: Take 1 tablet by mouth Daily.    lisinopril (PRINIVIL,ZESTRIL) 20 MG tablet       Sig: Take 1 tablet by mouth Daily.        Pharmacy where request should be sent: [id number] - [facility] [place] - [address] AT [facility] - [phone]  - [phone] FX     Last office visit with prescribing clinician: 8/26/2024   Last telemedicine visit with prescribing clinician: Visit date not found   Next office visit with prescribing clinician: 9/11/2024       Does the patient have less than a 3 day supply:  [] Yes  [x] No    Would you like a call back once the refill request has been completed: [] Yes [x] No    If the office needs to give you a call back, can they leave a voicemail: [] Yes [x] No    Nathaniel Tovar Rep   09/05/24 13:16 EDT

## 2024-09-11 ENCOUNTER — OFFICE VISIT (OUTPATIENT)
Dept: FAMILY MEDICINE CLINIC | Facility: CLINIC | Age: 54
End: 2024-09-11
Payer: COMMERCIAL

## 2024-09-11 VITALS
SYSTOLIC BLOOD PRESSURE: 118 MMHG | OXYGEN SATURATION: 97 % | HEIGHT: 72 IN | BODY MASS INDEX: 30.34 KG/M2 | TEMPERATURE: 98.3 F | HEART RATE: 84 BPM | DIASTOLIC BLOOD PRESSURE: 68 MMHG | WEIGHT: 224 LBS

## 2024-09-11 DIAGNOSIS — G47.33 OBSTRUCTIVE SLEEP APNEA SYNDROME: ICD-10-CM

## 2024-09-11 DIAGNOSIS — E11.65 TYPE 2 DIABETES MELLITUS WITH HYPERGLYCEMIA, WITH LONG-TERM CURRENT USE OF INSULIN: Primary | ICD-10-CM

## 2024-09-11 DIAGNOSIS — E55.9 VITAMIN D DEFICIENCY: ICD-10-CM

## 2024-09-11 DIAGNOSIS — Z79.4 TYPE 2 DIABETES MELLITUS WITH HYPERGLYCEMIA, WITH LONG-TERM CURRENT USE OF INSULIN: Primary | ICD-10-CM

## 2024-09-11 DIAGNOSIS — Z23 NEED FOR PNEUMOCOCCAL 20-VALENT CONJUGATE VACCINATION: ICD-10-CM

## 2024-09-11 DIAGNOSIS — G47.00 INSOMNIA, UNSPECIFIED TYPE: ICD-10-CM

## 2024-09-11 DIAGNOSIS — E78.1 HYPERTRIGLYCERIDEMIA: ICD-10-CM

## 2024-09-11 LAB
GLUCOSE BLDC GLUCOMTR-MCNC: 238 MG/DL (ref 70–130)
GLUCOSE BLDC GLUCOMTR-MCNC: 238 MG/DL (ref 70–99)
GLUCOSE SERPL-MCNC: 203 MG/DL (ref 65–99)

## 2024-09-11 PROCEDURE — 1160F RVW MEDS BY RX/DR IN RCRD: CPT | Performed by: NURSE PRACTITIONER

## 2024-09-11 PROCEDURE — 82947 ASSAY GLUCOSE BLOOD QUANT: CPT | Performed by: NURSE PRACTITIONER

## 2024-09-11 PROCEDURE — 82948 REAGENT STRIP/BLOOD GLUCOSE: CPT | Performed by: NURSE PRACTITIONER

## 2024-09-11 PROCEDURE — 1125F AMNT PAIN NOTED PAIN PRSNT: CPT | Performed by: NURSE PRACTITIONER

## 2024-09-11 PROCEDURE — 3078F DIAST BP <80 MM HG: CPT | Performed by: NURSE PRACTITIONER

## 2024-09-11 PROCEDURE — 3046F HEMOGLOBIN A1C LEVEL >9.0%: CPT | Performed by: NURSE PRACTITIONER

## 2024-09-11 PROCEDURE — 99214 OFFICE O/P EST MOD 30 MIN: CPT | Performed by: NURSE PRACTITIONER

## 2024-09-11 PROCEDURE — 1159F MED LIST DOCD IN RCRD: CPT | Performed by: NURSE PRACTITIONER

## 2024-09-11 PROCEDURE — 90677 PCV20 VACCINE IM: CPT | Performed by: NURSE PRACTITIONER

## 2024-09-11 PROCEDURE — 90471 IMMUNIZATION ADMIN: CPT | Performed by: NURSE PRACTITIONER

## 2024-09-11 PROCEDURE — 3074F SYST BP LT 130 MM HG: CPT | Performed by: NURSE PRACTITIONER

## 2024-09-11 RX ORDER — AMANTADINE HYDROCHLORIDE 100 MG/1
TABLET ORAL
COMMUNITY
Start: 2024-09-03

## 2024-09-11 RX ORDER — TRAZODONE HYDROCHLORIDE 50 MG/1
50 TABLET, FILM COATED ORAL NIGHTLY
Qty: 30 TABLET | Refills: 0 | Status: SHIPPED | OUTPATIENT
Start: 2024-09-11

## 2024-09-11 NOTE — PROGRESS NOTES
Chief Complaint  Diabetes (Follow up)    History of Present Illness  Manuel Demarco is a 53 y.o. male who presents to [facility] with a past medical history of    Past Medical History:   Diagnosis Date    Allergic 2011    Arthritis 2000    Coronary artery disease 2018    Diabetes mellitus     Not sure    GERD (gastroesophageal reflux disease)     Hyperlipidemia     Hypertension     Low back pain     Myocardial infarction 2018    Heart attack feb 2018 and again may 2020    Pancreatitis     Not sure     He is here today to follow up on diabetes - he has been checking his sugar pre-meal and 2 hours after.     08/26 - Lunch 164/148  Dinner 157/126    08/27 - Breakfast 145/300   Dinner 197/322    08/28 - Lunch 168/224    08/29 - Dinner 138/227    08/30 - Dinner 137/181    08/31 - Lunch 136/209    09/01 - Breakfast 106/143   Dinner 212/186    09/02 - Breakfast 136/189   Dinner 123/179    09/03 - Dinner 201/196    09/04 - forgot to check    09/05 - Dinner 122/118    09/06 - Breakfast 182/168    09/08 - Breakfast 103/132 (no pre-meal insulin)    09/09 - Breakfast 104/123 (no pre-meal insulin)    He is currently injecting 2mg Ozempic weekly - Lantus is 22 units nightly. SSI is Humalog - 4 units (141-180), 6 units (181-220), 8 units (221-260), 10 units (261-300), 12 units (301-350), 14 units (351-400), 16 units (401-450).    He is concerned that his Dexcom is not accurate.  His Dexcom is reading significantly lower than fingersticks.    At the time of his last appointment he had an HLA-B27 antigen performed.  That was negative.  Thus far labs for inflammatory arthritis have been negative.  He still complains of bilateral hand pain.  Used to take diclofenac which worked well, but cardiology had him stop all NSAID use.  He is seeing pain management and they are currently prescribing Percocet.    Additionally, he is requesting treatment for insomnia.  He has known obstructive sleep apnea.  He is  "partially compliant with CPAP.  He states it really does not matter whether or not he wears the CPAP still gets very little sleep.  He averages 1 to 2 hours of sleep at a time.  He endorses daytime somnolence.  He has not been treated for insomnia in the past.                                                       Objective   Vital Signs:   Vitals:    09/11/24 0915   BP: 118/68   Pulse: 84   Temp: 98.3 °F (36.8 °C)   TempSrc: Temporal   SpO2: 97%   Weight: 102 kg (224 lb)   Height: 182.9 cm (72\")     Body mass index is 30.38 kg/m².    Wt Readings from Last 3 Encounters:   09/11/24 102 kg (224 lb)   08/26/24 102 kg (224 lb)   08/19/24 101 kg (223 lb)     BP Readings from Last 3 Encounters:   09/11/24 118/68   08/26/24 124/70   08/19/24 124/76       Health Maintenance   Topic Date Due    COLORECTAL CANCER SCREENING  Never done    Hepatitis B (1 of 3 - 19+ 3-dose series) Never done    TDAP/TD VACCINES (1 - Tdap) Never done    ZOSTER VACCINE (1 of 2) Never done    COVID-19 Vaccine (3 - 2023-24 season) 09/01/2024    INFLUENZA VACCINE  08/01/2024    HEMOGLOBIN A1C  02/20/2025    DIABETIC EYE EXAM  04/04/2025    ANNUAL PHYSICAL  04/23/2025    DIABETIC FOOT EXAM  08/19/2025    BMI FOLLOWUP  08/19/2025    LIPID PANEL  08/20/2025    URINE MICROALBUMIN  08/20/2025    LUNG CANCER SCREENING  09/04/2025    HEPATITIS C SCREENING  Completed    Pneumococcal Vaccine 0-64  Completed       Physical Exam  Vitals reviewed.   Constitutional:       General: He is not in acute distress.     Appearance: He is well-developed. He is obese. He is not ill-appearing.   HENT:      Head: Normocephalic and atraumatic.   Eyes:      General: No scleral icterus.        Right eye: No discharge.         Left eye: No discharge.      Extraocular Movements: Extraocular movements intact.      Conjunctiva/sclera: Conjunctivae normal.   Cardiovascular:      Rate and Rhythm: Normal rate and regular rhythm.      Pulses: Normal pulses.      Heart sounds: No murmur " heard.  Pulmonary:      Effort: Pulmonary effort is normal.      Breath sounds: Normal breath sounds. No wheezing, rhonchi or rales.   Musculoskeletal:         General: Normal range of motion.      Right lower leg: No edema.      Left lower leg: No edema.   Skin:     General: Skin is warm and dry.   Neurological:      Mental Status: He is alert and oriented to person, place, and time.   Psychiatric:         Mood and Affect: Mood and affect normal.         Behavior: Behavior normal.         Thought Content: Thought content normal.         Judgment: Judgment normal.           Result Review :  The following data was reviewed by: JOSE RAUL Leung on 09/11/2024:    Office Visit on 09/11/2024   Component Date Value    Glucose 09/11/2024 238 (H)     Glucose 09/11/2024 238 (A)    Office Visit on 08/26/2024   Component Date Value    HLA B27 08/26/2024 Negative    Clinical Support on 08/20/2024   Component Date Value    WBC 08/20/2024 10.60     RBC 08/20/2024 5.74     Hemoglobin 08/20/2024 16.6     Hematocrit 08/20/2024 49.4     MCV 08/20/2024 86.1     MCH 08/20/2024 28.9     MCHC 08/20/2024 33.6     RDW 08/20/2024 15.3     RDW-SD 08/20/2024 47.3     MPV 08/20/2024 10.0     Platelets 08/20/2024 240     Neutrophil % 08/20/2024 65.7     Lymphocyte % 08/20/2024 23.7     Monocyte % 08/20/2024 8.1     Eosinophil % 08/20/2024 1.2     Basophil % 08/20/2024 0.9     Immature Grans % 08/20/2024 0.4     Neutrophils, Absolute 08/20/2024 6.96     Lymphocytes, Absolute 08/20/2024 2.51     Monocytes, Absolute 08/20/2024 0.86     Eosinophils, Absolute 08/20/2024 0.13     Basophils, Absolute 08/20/2024 0.10     Immature Grans, Absolute 08/20/2024 0.04     nRBC 08/20/2024 0.0     Glucose 08/20/2024 245 (H)     BUN 08/20/2024 13     Creatinine 08/20/2024 0.74 (L)     Sodium 08/20/2024 131 (L)     Potassium 08/20/2024 4.2     Chloride 08/20/2024 96 (L)     CO2 08/20/2024 22.3     Calcium 08/20/2024 10.3     Total Protein 08/20/2024 7.7      Albumin 08/20/2024 4.5     ALT (SGPT) 08/20/2024 41     AST (SGOT) 08/20/2024 22     Alkaline Phosphatase 08/20/2024 74     Total Bilirubin 08/20/2024 0.3     Globulin 08/20/2024 3.2     A/G Ratio 08/20/2024 1.4     BUN/Creatinine Ratio 08/20/2024 17.6     Anion Gap 08/20/2024 12.7     eGFR 08/20/2024 108.3     Hemoglobin A1C 08/20/2024 9.80 (H)     Total Cholesterol 08/20/2024 227 (H)     Triglycerides 08/20/2024 1,003 (H)     HDL Cholesterol 08/20/2024 29 (L)     LDL Cholesterol  08/20/2024      VLDL Cholesterol 08/20/2024      LDL/HDL Ratio 08/20/2024      TSH 08/20/2024 1.440     Free T4 08/20/2024 1.17     PSA 08/20/2024 1.070     25 Hydroxy, Vitamin D 08/20/2024 17.7 (L)     CCP Antibodies IgG/IgA 08/20/2024 4     Uric Acid 08/20/2024 3.2 (L)     ASO 08/20/2024 114.4     Rheumatoid Factor Quanti* 08/20/2024 <10.0     Sed Rate 08/20/2024 14     Creatine Kinase 08/20/2024 33     TUNDE 08/20/2024 Negative     Please note 08/20/2024 Comment     C-Reactive Protein 08/20/2024 <0.30     Lipoprotein (a) 08/20/2024 9.2     Apolipoprotein B 08/20/2024 112 (H)     Hepatitis C Ab 08/20/2024 Non-Reactive     Microalbumin/Creatinine * 08/20/2024      Creatinine, Urine 08/20/2024 95.1     Microalbumin, Urine 08/20/2024 <1.2     LDL Cholesterol  08/20/2024 75          Procedures        Assessment and Plan   Diagnoses and all orders for this visit:    1. Type 2 diabetes mellitus with hyperglycemia, with long-term current use of insulin (Primary)  -     Glucose, Random  -     POC Glucose  -     POCT Glucose    2. Insomnia, unspecified type  -     traZODone (DESYREL) 50 MG tablet; Take 1 tablet by mouth Every Night.  Dispense: 30 tablet; Refill: 0    3. Obstructive sleep apnea syndrome    4. Need for pneumococcal 20-valent conjugate vaccination  -     Pneumococcal Conjugate Vaccine 20-Valent (PCV20)    5. Hypertriglyceridemia  -     Lipid Panel  -     Comprehensive Metabolic Panel  -     Comprehensive Metabolic Panel; Future  -      Lipid Panel; Future    6. Vitamin D deficiency  -     Vitamin D,25-Hydroxy  -     Vitamin D,25-Hydroxy; Future            FOLLOW UP  Return in about 2 weeks (around 9/25/2024) for Next scheduled follow up.    POCT glucose was 238. Dexcom read 168. We will get random serum glucose to compare.     Continue Ozempic 2mg weekly. Keep Lantus at 22 units (0.2 u/kg @ 102 kg is approx. 21 units). Increase SSI by 2 units for each range: Humalog - 6 units (141-180), 8 units (181-220), 10 units (221-260), 12 units (261-300), 14 units (301-350), 16 units (351-400), 18 units (401-450). Continue to check pre/post meal - waiting 2 hours after eating for post-meal readings. Record. Folllow up again in 2 weeks.      Initiate Trazodone for insomnia - expressed importance of CPAP compliance with use of medication for treatment of insomnia. Voiced understanding. Will reassess at follow up in 2 weeks.    He is agreeable to receive his pneumococcal vaccine today.    Prior to leaving the lab incidentally collected his vitamin D, lipid, and CMP which was meant for November.  Lab was contacted and advised to cancel those orders as it was not yet time for the patient to have those labs drawn.  He will need to have those drawn fasting at the end of November.      Patient was given instructions and counseling regarding his condition or for health maintenance advice. Please see specific information pulled into the AVS if appropriate.       Ann Baker, APRN  09/11/24  10:18 EDT    CURRENT & DISCONTINUED MEDICATIONS  Current Outpatient Medications   Medication Instructions    amantadine (SYMMETREL) 100 MG tablet     aspirin 81 mg, Oral, Daily    atorvastatin (LIPITOR) 80 mg, Oral, Daily    BD Pen Needle Bharati 2nd Gen 32G X 4 MM misc See Admin Instructions, with insulin    clopidogrel (PLAVIX) 75 mg, Oral, Daily    Continuous Glucose  (Dexcom G7 ) device 1 each, Does not apply, Continuous    Continuous Glucose Sensor  (Dexcom G7 Sensor) misc 1 each, Does not apply, Continuous    EQL Omega 3 Fish Oil 1,000 mg, Oral, Daily    famotidine (PEPCID) 40 MG tablet 1 tablet, Oral, Daily    fenofibrate (TRICOR) 145 mg, Oral, Daily    Insulin Lispro, 1 Unit Dial, (HUMALOG) 100 UNIT/ML solution pen-injector SSI - 4 units (141-180), 6 units (181-220), 8 units (221-260), 10 units (261-300), 12 units (301-350), 14 units (351-400), 16 units (401-450)    Lantus SoloStar 22 Units, Subcutaneous, Nightly, Increase by 2 units every 3 days until fasting blood sugar is less than 140. Not to exceed 50 units per day.    lisinopril (PRINIVIL,ZESTRIL) 20 mg, Oral, Daily    metoprolol tartrate (LOPRESSOR) 25 MG tablet 1 tablet, Oral, 2 Times Daily    Narcan 4 MG/0.1ML nasal spray Take 1 spray by nasal route as directed.    nitroglycerin (NITROSTAT) 0.4 MG SL tablet     OneTouch Verio test strip 1 each, Other, 4 Times Daily Before Meals & Nightly, Use as instructed    oxyCODONE-acetaminophen (PERCOCET) 5-325 MG per tablet 1 tablet, Oral    Ozempic, 2 MG/DOSE, 8 MG/3ML solution pen-injector INJECT 2 MG SUBCUTANEOUS ONCE A WEEKY    traZODone (DESYREL) 50 mg, Oral, Nightly    triamcinolone (KENALOG) 0.1 % cream APPLY A THIN LAYER TO AFFECTED AREA(S) TWICE DAILY    vitamin D (ERGOCALCIFEROL) 50,000 Units, Oral, Weekly       There are no discontinued medications.

## 2024-09-11 NOTE — PROGRESS NOTES
Venipuncture Blood Specimen Collection  Venipuncture performed in left arm by Elise Peñaloza with good hemostasis. Patient tolerated the procedure well without complications.   09/11/24   Elise Peñaloza

## 2024-09-16 DIAGNOSIS — Z79.4 TYPE 2 DIABETES MELLITUS WITH HYPERGLYCEMIA, WITH LONG-TERM CURRENT USE OF INSULIN: ICD-10-CM

## 2024-09-16 DIAGNOSIS — E11.65 TYPE 2 DIABETES MELLITUS WITH HYPERGLYCEMIA, WITH LONG-TERM CURRENT USE OF INSULIN: ICD-10-CM

## 2024-09-16 RX ORDER — INSULIN GLARGINE 100 [IU]/ML
INJECTION, SOLUTION SUBCUTANEOUS
Qty: 15 ML | Refills: 0 | Status: SHIPPED | OUTPATIENT
Start: 2024-09-16

## 2024-09-25 ENCOUNTER — OFFICE VISIT (OUTPATIENT)
Dept: FAMILY MEDICINE CLINIC | Facility: CLINIC | Age: 54
End: 2024-09-25
Payer: COMMERCIAL

## 2024-09-25 VITALS
OXYGEN SATURATION: 96 % | TEMPERATURE: 98.4 F | SYSTOLIC BLOOD PRESSURE: 132 MMHG | DIASTOLIC BLOOD PRESSURE: 80 MMHG | HEART RATE: 103 BPM | BODY MASS INDEX: 30.24 KG/M2 | WEIGHT: 223 LBS

## 2024-09-25 DIAGNOSIS — E11.65 TYPE 2 DIABETES MELLITUS WITH HYPERGLYCEMIA, WITH LONG-TERM CURRENT USE OF INSULIN: Primary | ICD-10-CM

## 2024-09-25 DIAGNOSIS — G47.00 INSOMNIA, UNSPECIFIED TYPE: ICD-10-CM

## 2024-09-25 DIAGNOSIS — J06.9 UPPER RESPIRATORY INFECTION WITH COUGH AND CONGESTION: ICD-10-CM

## 2024-09-25 DIAGNOSIS — Z79.4 TYPE 2 DIABETES MELLITUS WITH HYPERGLYCEMIA, WITH LONG-TERM CURRENT USE OF INSULIN: Primary | ICD-10-CM

## 2024-09-25 DIAGNOSIS — G47.33 OBSTRUCTIVE SLEEP APNEA SYNDROME: ICD-10-CM

## 2024-09-25 PROCEDURE — 3075F SYST BP GE 130 - 139MM HG: CPT | Performed by: NURSE PRACTITIONER

## 2024-09-25 PROCEDURE — 99214 OFFICE O/P EST MOD 30 MIN: CPT | Performed by: NURSE PRACTITIONER

## 2024-09-25 PROCEDURE — 1125F AMNT PAIN NOTED PAIN PRSNT: CPT | Performed by: NURSE PRACTITIONER

## 2024-09-25 PROCEDURE — 3079F DIAST BP 80-89 MM HG: CPT | Performed by: NURSE PRACTITIONER

## 2024-09-25 PROCEDURE — 1160F RVW MEDS BY RX/DR IN RCRD: CPT | Performed by: NURSE PRACTITIONER

## 2024-09-25 PROCEDURE — 1159F MED LIST DOCD IN RCRD: CPT | Performed by: NURSE PRACTITIONER

## 2024-09-25 PROCEDURE — 3046F HEMOGLOBIN A1C LEVEL >9.0%: CPT | Performed by: NURSE PRACTITIONER

## 2024-09-25 RX ORDER — TRAZODONE HYDROCHLORIDE 100 MG/1
100 TABLET ORAL NIGHTLY
Qty: 30 TABLET | Refills: 0 | Status: SHIPPED | OUTPATIENT
Start: 2024-09-25

## 2024-09-25 RX ORDER — BENZONATATE 100 MG/1
100 CAPSULE ORAL 3 TIMES DAILY PRN
Qty: 30 CAPSULE | Refills: 0 | Status: SHIPPED | OUTPATIENT
Start: 2024-09-25

## 2024-09-25 RX ORDER — AZITHROMYCIN 250 MG/1
TABLET, FILM COATED ORAL
Qty: 6 TABLET | Refills: 0 | Status: SHIPPED | OUTPATIENT
Start: 2024-09-25

## 2024-10-21 ENCOUNTER — OFFICE VISIT (OUTPATIENT)
Dept: FAMILY MEDICINE CLINIC | Facility: CLINIC | Age: 54
End: 2024-10-21
Payer: COMMERCIAL

## 2024-10-21 VITALS
OXYGEN SATURATION: 97 % | WEIGHT: 227.2 LBS | BODY MASS INDEX: 30.81 KG/M2 | HEART RATE: 72 BPM | SYSTOLIC BLOOD PRESSURE: 122 MMHG | DIASTOLIC BLOOD PRESSURE: 78 MMHG | TEMPERATURE: 98.2 F

## 2024-10-21 DIAGNOSIS — Z79.4 TYPE 2 DIABETES MELLITUS WITH HYPERGLYCEMIA, WITH LONG-TERM CURRENT USE OF INSULIN: ICD-10-CM

## 2024-10-21 DIAGNOSIS — G47.00 INSOMNIA, UNSPECIFIED TYPE: Primary | ICD-10-CM

## 2024-10-21 DIAGNOSIS — E11.65 TYPE 2 DIABETES MELLITUS WITH HYPERGLYCEMIA, WITH LONG-TERM CURRENT USE OF INSULIN: ICD-10-CM

## 2024-10-21 PROCEDURE — 3078F DIAST BP <80 MM HG: CPT | Performed by: NURSE PRACTITIONER

## 2024-10-21 PROCEDURE — 99214 OFFICE O/P EST MOD 30 MIN: CPT | Performed by: NURSE PRACTITIONER

## 2024-10-21 PROCEDURE — 1125F AMNT PAIN NOTED PAIN PRSNT: CPT | Performed by: NURSE PRACTITIONER

## 2024-10-21 PROCEDURE — 1159F MED LIST DOCD IN RCRD: CPT | Performed by: NURSE PRACTITIONER

## 2024-10-21 PROCEDURE — 3046F HEMOGLOBIN A1C LEVEL >9.0%: CPT | Performed by: NURSE PRACTITIONER

## 2024-10-21 PROCEDURE — 1160F RVW MEDS BY RX/DR IN RCRD: CPT | Performed by: NURSE PRACTITIONER

## 2024-10-21 PROCEDURE — 3074F SYST BP LT 130 MM HG: CPT | Performed by: NURSE PRACTITIONER

## 2024-10-21 RX ORDER — TRAZODONE HYDROCHLORIDE 150 MG/1
150 TABLET ORAL NIGHTLY
Qty: 30 TABLET | Refills: 0 | Status: SHIPPED | OUTPATIENT
Start: 2024-10-21

## 2024-10-21 NOTE — PROGRESS NOTES
Chief Complaint  Insomnia    Insomnia      Manuel Demarco is a 54 y.o. male who presents to [facility] with a past medical history of  Past Medical History:   Diagnosis Date    Allergic 2011    Arthritis 2000    Coronary artery disease 2018    Diabetes mellitus     Not sure    GERD (gastroesophageal reflux disease)     Hyperlipidemia     Hypertension     Low back pain     Myocardial infarction 2018    Heart attack feb 2018 and again may 2020    Pancreatitis     Not sure     Mr. Demarco presents to the office today to follow-up on insomnia.      At the time of his last appointment he was prescribed trazodone 100 mg nightly.  This was increased from 50 mg nightly due to lack of efficacy.  When he was taking 50 mg nightly he was still waking up every hour.  With 100 mg nightly he is getting approximately 5 to 6 hours of sleep.  He states over the weekend he went to a hotel in [place] for an event being held in [place] over the weekend and he slept great.  At home he does not sleep as well as he did in the hotel.  He feels that his mattress is somewhat new, purchased within the past 1 to 3 years.  Usually if he wakes up it is to go to the bathroom to void or it is related to back pain.  There are some nights he can void up to 7 times per night, but other times just 1-2 times per night.  He denies burning, urgency, dysuria, gross hematuria, postvoid dribbling, urinary hesitancy, or decrease in urinary stream.  PSA was normal just a few months ago.  Back pain is currently being treated by pain management.  He is being treated with Percocet 5-325mg 3 times daily as needed.    He also recently saw endocrinology, JOSE RAUL Quintanilla for diabetes management.  She did add Jardiance 10 mg daily to his regimen.  Goal fasting blood sugar less than 130, 2-hour postprandial goal less than 180.    Objective   Vital Signs:   Vitals:    10/21/24 1128   BP: 122/78   BP Location: Right arm    Patient Position: Sitting   Pulse: 72   Temp: 98.2 °F (36.8 °C)   SpO2: 97%   Weight: 103 kg (227 lb 3.2 oz)   PainSc:   8   PainLoc: Back     Body mass index is 30.81 kg/m².    Wt Readings from Last 3 Encounters:   10/21/24 103 kg (227 lb 3.2 oz)   09/25/24 101 kg (223 lb)   09/11/24 102 kg (224 lb)     BP Readings from Last 3 Encounters:   10/21/24 122/78   09/25/24 132/80   09/11/24 118/68       Health Maintenance   Topic Date Due    Hepatitis B (1 of 3 - 19+ 3-dose series) Never done    ZOSTER VACCINE (1 of 2) 10/21/2024 (Originally 10/5/2020)    COVID-19 Vaccine (3 - 2023-24 season) 10/23/2024 (Originally 9/1/2024)    INFLUENZA VACCINE  03/31/2025 (Originally 8/1/2024)    TDAP/TD VACCINES (1 - Tdap) 10/21/2025 (Originally 10/5/1989)    HEMOGLOBIN A1C  02/20/2025    DIABETIC EYE EXAM  04/04/2025    ANNUAL PHYSICAL  04/23/2025    DIABETIC FOOT EXAM  08/19/2025    BMI FOLLOWUP  08/19/2025    LIPID PANEL  08/20/2025    URINE MICROALBUMIN  08/20/2025    LUNG CANCER SCREENING  09/04/2025    COLORECTAL CANCER SCREENING  09/07/2027    HEPATITIS C SCREENING  Completed    Pneumococcal Vaccine 0-64  Completed       Physical Exam  Vitals reviewed.   Constitutional:       General: He is not in acute distress.     Appearance: He is well-developed. He is obese. He is not ill-appearing.   HENT:      Head: Normocephalic and atraumatic.   Eyes:      General: No scleral icterus.        Right eye: No discharge.         Left eye: No discharge.      Extraocular Movements: Extraocular movements intact.      Conjunctiva/sclera: Conjunctivae normal.   Cardiovascular:      Rate and Rhythm: Normal rate and regular rhythm.      Pulses: Normal pulses.      Heart sounds: No murmur heard.  Pulmonary:      Effort: Pulmonary effort is normal.      Breath sounds: Normal breath sounds. No wheezing, rhonchi or rales.   Musculoskeletal:         General: Normal range of motion.      Cervical back: Normal range of motion.   Skin:     General: Skin  is warm and dry.   Neurological:      Mental Status: He is alert and oriented to person, place, and time.   Psychiatric:         Mood and Affect: Mood and affect normal.         Behavior: Behavior normal.         Thought Content: Thought content normal.         Judgment: Judgment normal.       Result Review :  The following data was reviewed by: JOSE RAUL Leung on 10/21/2024:    No visits with results within 1 Month(s) from this visit.   Latest known visit with results is:   Office Visit on 09/11/2024   Component Date Value    Glucose 09/11/2024 203 (H)     Glucose 09/11/2024 238 (H)     Glucose 09/11/2024 238 (A)      Common labs          11/29/2023    10:21 8/20/2024    09:13 8/20/2024    09:20 9/11/2024    10:16   Common Labs   Glucose  245   203    BUN  13      Creatinine  0.74      Sodium  131      Potassium  4.2      Chloride  96      Calcium  10.3      Albumin  4.5      Total Bilirubin  0.3      Alkaline Phosphatase  74      AST (SGOT)  22      ALT (SGPT)  41      WBC 9.10     10.60      Hemoglobin 14.7     16.6      Hematocrit 41.9     49.4      Platelets 214     240      Total Cholesterol  227      Triglycerides  1,003      HDL Cholesterol  29      LDL Cholesterol   75      Hemoglobin A1C  9.80      Microalbumin, Urine   <1.2     PSA  1.070      Uric Acid  3.2         Details          This result is from an external source.               Procedures        Assessment and Plan   Diagnoses and all orders for this visit:    1. Insomnia, unspecified type (Primary)  -     traZODone (DESYREL) 150 MG tablet; Take 1 tablet by mouth Every Night.  Dispense: 30 tablet; Refill: 0    2. Type 2 diabetes mellitus with hyperglycemia, with long-term current use of insulin  Comments:  Jardiance added per endo - continue glucose monitoring - fasting less than 130, 2 hour postprandial less than 180.                  FOLLOW UP  Return in about 4 weeks (around 11/18/2024) for Next scheduled follow up.    I will increase  his trazodone to 150 mg nightly to target his insomnia as the dose increased from 50 mg to 100 mg has been effective.  Advised him to monitor his awakenings and take note of whether or not he is awakening due to pain, needing to void, or just because he is not able to sleep..  Patient educated that we do not want to increase his sleep medication if he is waking up to go to the bathroom or waking up due to pain.  He will track urinary frequency at night and if ongoing at the time of follow-up we will consider referral to urology.  At this time he does not have any urinary symptoms other than nocturia.    Patient was given instructions and counseling regarding his condition or for health maintenance advice. Please see specific information pulled into the AVS if appropriate.       Ann Baker, APRN  10/21/24  13:01 EDT    CURRENT & DISCONTINUED MEDICATIONS  Current Outpatient Medications   Medication Instructions    amantadine (SYMMETREL) 100 MG tablet     aspirin 81 mg, Oral, Daily    atorvastatin (LIPITOR) 80 mg, Oral, Daily    BD Pen Needle Bharati 2nd Gen 32G X 4 MM misc See Admin Instructions    clopidogrel (PLAVIX) 75 mg, Daily    Continuous Glucose  (Dexcom G7 ) device 1 each, Does not apply, Continuous    Continuous Glucose Sensor (Dexcom G7 Sensor) misc 1 each, Does not apply, Continuous    empagliflozin (JARDIANCE) 10 mg, Oral, Daily    EQL Omega 3 Fish Oil 1,000 mg, Oral, Daily    famotidine (PEPCID) 40 MG tablet 1 tablet, Daily    fenofibrate (TRICOR) 145 mg, Oral, Daily    Insulin Lispro, 1 Unit Dial, (HUMALOG) 100 UNIT/ML solution pen-injector SSI - 4 units (141-180), 6 units (181-220), 8 units (221-260), 10 units (261-300), 12 units (301-350), 14 units (351-400), 16 units (401-450)    Lantus SoloStar 100 UNIT/ML injection pen INJECT 22 UNITS UNDER THE SKIN AT BEDTIME, INCREASE BY 2 UNITS EVERY 3 DAYS UNTIL FASTING BLOOD SUGAR IS LESS THAN 140 NOT EXCEED 50 UNITS A DAY    lisinopril  (PRINIVIL,ZESTRIL) 20 mg, Oral, Daily    metoprolol tartrate (LOPRESSOR) 25 MG tablet 1 tablet, 2 Times Daily    Narcan 4 MG/0.1ML nasal spray Take 1 spray by nasal route as directed.    nitroglycerin (NITROSTAT) 0.4 MG SL tablet     OneTouch Verio test strip 1 each, Other, 4 Times Daily Before Meals & Nightly, Use as instructed    oxyCODONE-acetaminophen (PERCOCET) 5-325 MG per tablet 1 tablet    Ozempic, 2 MG/DOSE, 8 MG/3ML solution pen-injector INJECT 2 MG SUBCUTANEOUS ONCE A WEEKY    traZODone (DESYREL) 150 mg, Oral, Nightly    triamcinolone (KENALOG) 0.1 % cream APPLY A THIN LAYER TO AFFECTED AREA(S) TWICE DAILY    vitamin D (ERGOCALCIFEROL) 50,000 Units, Oral, Weekly       Medications Discontinued During This Encounter   Medication Reason    azithromycin (Zithromax Z-Bo) 250 MG tablet *Therapy completed    benzonatate (Tessalon Perles) 100 MG capsule *Therapy completed    traZODone (DESYREL) 100 MG tablet Reorder

## 2024-10-28 ENCOUNTER — OFFICE VISIT (OUTPATIENT)
Dept: GASTROENTEROLOGY | Facility: CLINIC | Age: 54
End: 2024-10-28
Payer: COMMERCIAL

## 2024-10-28 ENCOUNTER — TELEPHONE (OUTPATIENT)
Dept: GASTROENTEROLOGY | Facility: CLINIC | Age: 54
End: 2024-10-28
Payer: COMMERCIAL

## 2024-10-28 VITALS
WEIGHT: 224 LBS | SYSTOLIC BLOOD PRESSURE: 117 MMHG | BODY MASS INDEX: 30.38 KG/M2 | OXYGEN SATURATION: 97 % | HEART RATE: 75 BPM | DIASTOLIC BLOOD PRESSURE: 68 MMHG

## 2024-10-28 DIAGNOSIS — R19.5 POSITIVE COLORECTAL CANCER SCREENING USING COLOGUARD TEST: Primary | ICD-10-CM

## 2024-10-28 PROCEDURE — 3078F DIAST BP <80 MM HG: CPT

## 2024-10-28 PROCEDURE — 99214 OFFICE O/P EST MOD 30 MIN: CPT

## 2024-10-28 PROCEDURE — 3074F SYST BP LT 130 MM HG: CPT

## 2024-10-28 PROCEDURE — 1160F RVW MEDS BY RX/DR IN RCRD: CPT

## 2024-10-28 PROCEDURE — 1159F MED LIST DOCD IN RCRD: CPT

## 2024-10-28 RX ORDER — ONDANSETRON 4 MG/1
TABLET, FILM COATED ORAL
Qty: 2 TABLET | Refills: 0 | Status: SHIPPED | OUTPATIENT
Start: 2024-10-28

## 2024-10-28 NOTE — TELEPHONE ENCOUNTER
Caller: Manuel Demarco    Relationship to patient: Self    Best call back number: [phone]    Chief complaint: NEEDS TO R/S COLONOSCOPY     Type of visit: COLONOSCOPY     Requested date:     If rescheduling, when is the original appointment: 12.19    Additional notes:

## 2024-10-28 NOTE — PROGRESS NOTES
Chief Complaint     Heartburn and Colonoscopy (Pt would like to talk about colonoscopy)    History of Present Illness     Manuel Demarco is a 54 y.o. male with PMH of type 2 diabetes, obstructive sleep apnea who presents to [facility] on referral from GALE Leung* for a gastroenterology evaluation of GERD, colon cancer screening, and history of pancreatitis.      History of present illness:  Patient explains that he has a history of heartburn but if he takes Famotidine his heartburn is contained. Reports a breakthrough episode of reflux, that happened during the middle of the night last week and woke up with burning in throat. Patient has been on Ozempic for a few years. Denies nausea or vomiting. No reports of dysphagia or unintentional weight loss.     Reports 1 Bm everyday, stool described consistency varies and brown and yellow in color. Denies bright red blood or black in stools. No reports of abdominal pain. Notes he has a history of Pancreatitis over a year ago. Denies alcohol use.     Patient positive family history of colon cancer found in mother diagnosed at 70s. Two brothers had liver cancer. Patient unsure of family history of colon polyps. No previous history of EGD/Colonoscopy. Cologuard 9/7/2024 was positive.      History      Past Medical History:   Diagnosis Date    Allergic 2011    Arthritis 2000    Coronary artery disease 2018    Diabetes mellitus     Not sure    GERD (gastroesophageal reflux disease)     Hyperlipidemia     Hypertension     Low back pain     Myocardial infarction 2018    Heart attack feb 2018 and again may 2020    Pancreatitis     Not sure       Past Surgical History:   Procedure Laterality Date    CARDIAC CATHETERIZATION      Not sure i have had 3 or 4    CORONARY STENT PLACEMENT      Have 6 stints    HERNIA REPAIR      I think 2016    JOINT REPLACEMENT      UMBILICAL HERNIA REPAIR      I think 2016       Family History   Problem  Relation Age of Onset    Colon cancer Mother     Arthritis Mother     Cancer Mother     COPD Mother     Diabetes Mother     Heart disease Mother          in     Hyperlipidemia Mother     Hypertension Mother     Alcohol abuse Father          in     Arthritis Father     Cancer Father     Diabetes Father     Heart disease Father          in     Hyperlipidemia Father     Hypertension Father     Alcohol abuse Brother         Both dead    Hyperlipidemia Brother     Hypertension Brother     Liver disease Brother     Arthritis Brother     Heart disease Brother          in     Cancer Brother         Current Medications        Current Outpatient Medications:     aspirin 81 MG EC tablet, Take 1 tablet by mouth Daily., Disp: 90 tablet, Rfl: 3    atorvastatin (LIPITOR) 80 MG tablet, Take 1 tablet by mouth Daily., Disp: 90 tablet, Rfl: 0    BD Pen Needle Bharati 2nd Gen 32G X 4 MM misc, See Admin Instructions. with insulin, Disp: , Rfl:     clopidogrel (PLAVIX) 75 MG tablet, Take 1 tablet by mouth Daily., Disp: , Rfl:     Continuous Glucose  (Dexcom G7 ) device, Use 1 each Continuous., Disp: 1 each, Rfl: 0    Continuous Glucose Sensor (Dexcom G7 Sensor) misc, Use 1 each Continuous., Disp: 6 each, Rfl: 1    empagliflozin (JARDIANCE) 10 MG tablet tablet, Take 1 tablet by mouth Daily. Indications: Type 2 Diabetes, Disp: , Rfl:     famotidine (PEPCID) 40 MG tablet, Take 1 tablet by mouth Daily., Disp: , Rfl:     fenofibrate (Tricor) 145 MG tablet, Take 1 tablet by mouth Daily., Disp: 90 tablet, Rfl: 0    Insulin Lispro, 1 Unit Dial, (HUMALOG) 100 UNIT/ML solution pen-injector, SSI - 4 units (141-180), 6 units (181-220), 8 units (221-260), 10 units (261-300), 12 units (301-350), 14 units (351-400), 16 units (401-450), Disp: , Rfl:     Lantus SoloStar 100 UNIT/ML injection pen, INJECT 22 UNITS UNDER THE SKIN AT BEDTIME, INCREASE BY 2 UNITS EVERY 3 DAYS UNTIL FASTING BLOOD SUGAR IS LESS THAN  140 NOT EXCEED 50 UNITS A DAY, Disp: 15 mL, Rfl: 0    lisinopril (PRINIVIL,ZESTRIL) 20 MG tablet, Take 1 tablet by mouth Daily., Disp: 90 tablet, Rfl: 0    metoprolol tartrate (LOPRESSOR) 25 MG tablet, Take 1 tablet by mouth 2 (Two) Times a Day., Disp: , Rfl:     Narcan 4 MG/0.1ML nasal spray, Take 1 spray by nasal route as directed., Disp: , Rfl:     nitroglycerin (NITROSTAT) 0.4 MG SL tablet, , Disp: , Rfl:     Omega-3 Fatty Acids (EQL Omega 3 Fish Oil) 1000 MG capsule delayed-release, Take 1,000 mg by mouth Daily., Disp: 90 capsule, Rfl: 0    OneTouch Verio test strip, 1 each by Other route 4 (Four) Times a Day Before Meals & at Bedtime. Use as instructed, Disp: 400 each, Rfl: 3    oxyCODONE-acetaminophen (PERCOCET) 5-325 MG per tablet, Take 1 tablet by mouth., Disp: , Rfl:     Ozempic, 2 MG/DOSE, 8 MG/3ML solution pen-injector, INJECT 2 MG SUBCUTANEOUS ONCE A WEEKY, Disp: , Rfl:     traZODone (DESYREL) 150 MG tablet, Take 1 tablet by mouth Every Night., Disp: 30 tablet, Rfl: 0    triamcinolone (KENALOG) 0.1 % cream, APPLY A THIN LAYER TO AFFECTED AREA(S) TWICE DAILY, Disp: , Rfl:     vitamin D (ERGOCALCIFEROL) 1.25 MG (07694 UT) capsule capsule, Take 1 capsule by mouth 1 (One) Time Per Week., Disp: 13 capsule, Rfl: 0    amantadine (SYMMETREL) 100 MG tablet, , Disp: , Rfl:     ondansetron (Zofran) 4 MG tablet, 1 po 1 hour before prep, may repeat in 4-6 hrs as needed for n/v, Disp: 2 tablet, Rfl: 0    polyethylene glycol (GoLYTELY) 236 g solution, Follow office instructions., Disp: 4000 mL, Rfl: 0     Allergies     Allergies   Allergen Reactions    Cephalexin Hives, Itching, Unknown - Low Severity, Rash, Shortness Of Breath, Unknown - High Severity, Other (See Comments), Swelling and Nausea And Vomiting       Social History       Social History     Social History Narrative    Not on file       Immunizations     Immunization:  Immunization History   Administered Date(s) Administered    COVID-19 (PFIZER) Purple Cap  "Monovalent 04/24/2021, 05/15/2021    Pneumococcal Conjugate 20-Valent (PCV20) 09/11/2024          Objective     Objective     Vital Signs:   /68 (BP Location: Right arm, Patient Position: Sitting, Cuff Size: Adult)   Pulse 75   Wt 102 kg (224 lb)   SpO2 97%   BMI 30.38 kg/m²       Physical Exam  HENT:      Head: Normocephalic.   Cardiovascular:      Rate and Rhythm: Normal rate.   Pulmonary:      Effort: Pulmonary effort is normal.   Abdominal:      General: Bowel sounds are normal.   Musculoskeletal:         General: Normal range of motion.   Neurological:      General: No focal deficit present.      Mental Status: He is alert.   Psychiatric:         Mood and Affect: Mood normal.         Results      Result Review :   The following data was reviewed by: JOSE RAUL Christie on 10/28/2024:    Lab Results - Last 18 Months   Lab Units 08/20/24  0913 11/29/23  1021   WBC 10*3/mm3 10.60 9.10   HEMOGLOBIN g/dL 16.6 14.7   MCV fL 86.1 83.1   PLATELETS 10*3/mm3 240 214         Lab Results - Last 18 Months   Lab Units 09/11/24  1016 08/20/24  0913   BUN mg/dL  --  13   CREATININE mg/dL  --  0.74*   SODIUM mmol/L  --  131*   POTASSIUM mmol/L  --  4.2   CHLORIDE mmol/L  --  96*   CO2 mmol/L  --  22.3   GLUCOSE mg/dL 203* 245*      Lab Results - Last 18 Months   Lab Units 11/29/23  1021   PROTIME s 9.9*   INR INR 0.9   APTT s 29.7     Lab Results - Last 18 Months   Lab Units 08/20/24  0913   AST (SGOT) U/L 22   ALT (SGPT) U/L 41   ALK PHOS U/L 74   BILIRUBIN mg/dL 0.3   TOTAL PROTEIN g/dL 7.7   ALBUMIN g/dL 4.5      No results for input(s): \"IRON\", \"TRANSFERRIN\", \"TIBC\", \"FERRITIN\", \"ETTMYZQE42\", \"FOLATE\" in the last 63608 hours.  No results for input(s): \"HAV\", \"HEPAIGM\", \"HEPBIGM\", \"HEPBCAB\", \"HBEAG\", \"HEPCAB\" in the last 95319 hours.    CT Chest Low Dose Cancer Screening WO    Result Date: 9/5/2024  Impression: 1. No suspicious pulmonary nodules Recommendation: Continue annual screening with LDCT Lung " Rads Assessment: Lung-RADS L2 - Benign appearance or <1% chance of malignancy. Electronically Signed: Parviz Claros MD  9/5/2024 11:40 AM EDT  Workstation ID: [id number]         Assessment and Plan        Assessment and Plan    Diagnoses and all orders for this visit:    1. Positive colorectal cancer screening using Cologuard test (Primary)  -     Case Request; Standing  -     Follow Anesthesia Guidelines / Protocol; Future  -     polyethylene glycol (GoLYTELY) 236 g solution; Follow office instructions.  Dispense: 4000 mL; Refill: 0  -     Case Request  -     ondansetron (Zofran) 4 MG tablet; 1 po 1 hour before prep, may repeat in 4-6 hrs as needed for n/v  Dispense: 2 tablet; Refill: 0    Other orders  -     Follow Anesthesia Guidelines / Protocol; Standing  -     Verify NPO; Standing  -     Verify Bowel Prep Was Successful; Standing  -     Give Tap Water Enema If Bowel Prep Insufficient; Standing        COLONOSCOPY; A flexible tube with camera and light at the end will pass from the rear end to visualize the large bowel with possible removal of protruded tissue to send for testing. (N/A)      Follow Up        Follow Up   Return for Follow up after Colonoscopy.    Colonoscopy ordered due to positive Cologuard. 4 L bowel prep ordered. I have recommended that the patient undergo further evaluation with a colonoscopy.  I have discussed this procedure in detail with the patient.  I have discussed the risk, benefits and alternatives.  I have discussed the risk of anesthesia, bleeding and perforation.  Patient understands these risks, benefits and alternatives and wishes to proceed.  I will schedule him at his earliest convenience.  Patient agreeable to this plan and will call with any questions or concerns.    Continue Famotidine     Patient was given instructions and counseling regarding his condition or for health maintenance advice. Please see specific information pulled into the AVS if appropriate.

## 2024-10-28 NOTE — TELEPHONE ENCOUNTER
Returned pt call and r/s colonoscopy.       Manuel Demarco  1970    Patient requested to Reschedule their Colonoscopy. I have offered to reschedule this patient and patient has agreed. Patient has been rescheduled to 12/12/2024.    Reason for cancelling/rescheduling: Pt wasn't able to take off of work.    This procedure was ordered by JOSE RAUL Christie for an important reason. We want to inform you that there are risks associated with not proceeding with the procedure at this time such as a delay in diagnosis, risk of incurable disease, or cancer.    Patient plans to call us back to reschedule: No    Updated clearance needed?: No    If yes, clearance request has been submitted to:    Is the patient currently on any injectable medications for weight loss or diabetes? Yes    If yes, are they aware that they will need to discontinue this 7 days prior to their procedure? Yes    Patient verbalized understanding for all of the above information.

## 2024-11-06 DIAGNOSIS — G47.00 INSOMNIA, UNSPECIFIED TYPE: ICD-10-CM

## 2024-11-07 RX ORDER — TRAZODONE HYDROCHLORIDE 100 MG/1
100 TABLET ORAL NIGHTLY
Qty: 30 TABLET | Refills: 0 | OUTPATIENT
Start: 2024-11-07

## 2024-11-14 ENCOUNTER — CLINICAL SUPPORT (OUTPATIENT)
Dept: FAMILY MEDICINE CLINIC | Facility: CLINIC | Age: 54
End: 2024-11-14
Payer: COMMERCIAL

## 2024-11-14 DIAGNOSIS — E78.1 HYPERTRIGLYCERIDEMIA: ICD-10-CM

## 2024-11-14 DIAGNOSIS — E55.9 VITAMIN D DEFICIENCY: ICD-10-CM

## 2024-11-14 LAB
25(OH)D3 SERPL-MCNC: 21.3 NG/ML (ref 30–100)
ALBUMIN SERPL-MCNC: 4.3 G/DL (ref 3.5–5.2)
ALBUMIN/GLOB SERPL: 1.5 G/DL
ALP SERPL-CCNC: 33 U/L (ref 39–117)
ALT SERPL W P-5'-P-CCNC: 23 U/L (ref 1–41)
ANION GAP SERPL CALCULATED.3IONS-SCNC: 13.4 MMOL/L (ref 5–15)
AST SERPL-CCNC: 19 U/L (ref 1–40)
BILIRUB SERPL-MCNC: 0.3 MG/DL (ref 0–1.2)
BUN SERPL-MCNC: 13 MG/DL (ref 6–20)
BUN/CREAT SERPL: 16 (ref 7–25)
CALCIUM SPEC-SCNC: 9.5 MG/DL (ref 8.6–10.5)
CHLORIDE SERPL-SCNC: 99 MMOL/L (ref 98–107)
CHOLEST SERPL-MCNC: 144 MG/DL (ref 0–200)
CO2 SERPL-SCNC: 22.6 MMOL/L (ref 22–29)
CREAT SERPL-MCNC: 0.81 MG/DL (ref 0.76–1.27)
EGFRCR SERPLBLD CKD-EPI 2021: 104.8 ML/MIN/1.73
GLOBULIN UR ELPH-MCNC: 2.9 GM/DL
GLUCOSE SERPL-MCNC: 130 MG/DL (ref 65–99)
HDLC SERPL-MCNC: 25 MG/DL (ref 40–60)
LDLC SERPL CALC-MCNC: 51 MG/DL (ref 0–100)
LDLC/HDLC SERPL: 1.09 {RATIO}
POTASSIUM SERPL-SCNC: 3.8 MMOL/L (ref 3.5–5.2)
PROT SERPL-MCNC: 7.2 G/DL (ref 6–8.5)
SODIUM SERPL-SCNC: 135 MMOL/L (ref 136–145)
TRIGL SERPL-MCNC: 459 MG/DL (ref 0–150)
VLDLC SERPL-MCNC: 68 MG/DL (ref 5–40)

## 2024-11-14 PROCEDURE — 36415 COLL VENOUS BLD VENIPUNCTURE: CPT | Performed by: NURSE PRACTITIONER

## 2024-11-14 PROCEDURE — 80053 COMPREHEN METABOLIC PANEL: CPT | Performed by: NURSE PRACTITIONER

## 2024-11-14 PROCEDURE — 80061 LIPID PANEL: CPT | Performed by: NURSE PRACTITIONER

## 2024-11-14 PROCEDURE — 82306 VITAMIN D 25 HYDROXY: CPT | Performed by: NURSE PRACTITIONER

## 2024-11-14 NOTE — PROGRESS NOTES
Venipuncture Blood Specimen Collection  Venipuncture performed in LEFT ARM by Francisco J Borrero with good hemostasis. Patient tolerated the procedure well without complications.   11/14/24   Francisco J Borrero

## 2024-11-18 ENCOUNTER — OFFICE VISIT (OUTPATIENT)
Dept: FAMILY MEDICINE CLINIC | Facility: CLINIC | Age: 54
End: 2024-11-18
Payer: COMMERCIAL

## 2024-11-18 ENCOUNTER — TELEPHONE (OUTPATIENT)
Dept: FAMILY MEDICINE CLINIC | Facility: CLINIC | Age: 54
End: 2024-11-18

## 2024-11-18 VITALS
WEIGHT: 222 LBS | SYSTOLIC BLOOD PRESSURE: 122 MMHG | HEIGHT: 72 IN | OXYGEN SATURATION: 97 % | BODY MASS INDEX: 30.07 KG/M2 | DIASTOLIC BLOOD PRESSURE: 84 MMHG | HEART RATE: 92 BPM | TEMPERATURE: 98.3 F

## 2024-11-18 DIAGNOSIS — G47.00 INSOMNIA, UNSPECIFIED TYPE: Primary | ICD-10-CM

## 2024-11-18 DIAGNOSIS — G47.00 INSOMNIA, UNSPECIFIED TYPE: ICD-10-CM

## 2024-11-18 DIAGNOSIS — E78.1 HYPERTRIGLYCERIDEMIA: ICD-10-CM

## 2024-11-18 DIAGNOSIS — E78.5 DYSLIPIDEMIA, GOAL LDL BELOW 70: ICD-10-CM

## 2024-11-18 DIAGNOSIS — E55.9 VITAMIN D DEFICIENCY: ICD-10-CM

## 2024-11-18 PROCEDURE — 3074F SYST BP LT 130 MM HG: CPT | Performed by: NURSE PRACTITIONER

## 2024-11-18 PROCEDURE — 1125F AMNT PAIN NOTED PAIN PRSNT: CPT | Performed by: NURSE PRACTITIONER

## 2024-11-18 PROCEDURE — 3079F DIAST BP 80-89 MM HG: CPT | Performed by: NURSE PRACTITIONER

## 2024-11-18 PROCEDURE — 3046F HEMOGLOBIN A1C LEVEL >9.0%: CPT | Performed by: NURSE PRACTITIONER

## 2024-11-18 PROCEDURE — 1160F RVW MEDS BY RX/DR IN RCRD: CPT | Performed by: NURSE PRACTITIONER

## 2024-11-18 PROCEDURE — 99214 OFFICE O/P EST MOD 30 MIN: CPT | Performed by: NURSE PRACTITIONER

## 2024-11-18 PROCEDURE — 1159F MED LIST DOCD IN RCRD: CPT | Performed by: NURSE PRACTITIONER

## 2024-11-18 RX ORDER — TRAZODONE HYDROCHLORIDE 150 MG/1
150 TABLET ORAL NIGHTLY
Qty: 30 TABLET | Refills: 0 | Status: CANCELLED | OUTPATIENT
Start: 2024-11-18

## 2024-11-18 RX ORDER — ERGOCALCIFEROL 1.25 MG/1
50000 CAPSULE, LIQUID FILLED ORAL WEEKLY
Qty: 13 CAPSULE | Refills: 0 | Status: SHIPPED | OUTPATIENT
Start: 2024-11-18

## 2024-11-18 RX ORDER — TRAZODONE HYDROCHLORIDE 150 MG/1
150 TABLET ORAL NIGHTLY
Qty: 90 TABLET | Refills: 0 | Status: SHIPPED | OUTPATIENT
Start: 2024-11-18

## 2024-11-18 NOTE — TELEPHONE ENCOUNTER
Caller: Manuel Demarco    Relationship: Self    Best call back number: [phone]    Requested Prescriptions:   Requested Prescriptions     Pending Prescriptions Disp Refills    traZODone (DESYREL) 150 MG tablet 30 tablet 0     Sig: Take 1 tablet by mouth Every Night.        Pharmacy where request should be sent: [id number] [address] AT [facility] [phone]  - [phone] FX     Last office visit with prescribing clinician: 11/18/2024   Last telemedicine visit with prescribing clinician: Visit date not found   Next office visit with prescribing clinician: Visit date not found     Additional details provided by patient: PATIENT CALLED TO VERIFY THE DOSAGE AND  MG IS CORRECT     Does the patient have less than a 3 day supply:  [] Yes  [x] No    Would you like a call back once the refill request has been completed: [] Yes [x] No    If the office needs to give you a call back, can they leave a voicemail: [] Yes [x] No    Nathaniel Tovar Rep   11/18/24 13:05 EST

## 2024-11-18 NOTE — PROGRESS NOTES
Chief Complaint  Insomnia (1 month follow up)    History of Present Illness  Manuel Demarco is a 54 y.o. male who presents to [facility] with a past medical history of    Past Medical History:   Diagnosis Date    Allergic 2011    Arthritis 2000    Coronary artery disease 2018    Diabetes mellitus     Not sure    GERD (gastroesophageal reflux disease)     Hyperlipidemia     Hypertension     Low back pain     Myocardial infarction 2018    Heart attack feb 2018 and again may 2020    Pancreatitis     Not sure     Mr. Demarco presents to the office today for 1 month follow-up on insomnia.    At the time of his last appointment in October his trazodone was increased from 100 mg nightly to 150 mg nightly.  Today, he reports that he thinks he is only taking 100 mg; he is not certain as the bottle is at home.  He states regardless of the dose, his sleep seems to be much improved over the past month.  He is sleeping well at least 5 out of 7 nights weekly.  If he does not sleep well it is usually due to pain or nocturia.  On average he gets up 2-3 times per night at most.  There may be a night or 2 where he does void more frequently than another, but states that even if he is up secondary to pain he will go ahead and go to the bathroom.  He had a recent PSA which was normal.  To his knowledge she does not have any prostate issues.  He denies any daytime urinary symptoms to include urgency, frequency, burning, hematuria, difficulty initiating or maintaining stream, or postvoid dribbling.  He denies any daytime somnolence with use of trazodone.    He did have recent follow-up labs secondary to his dyslipidemia.  He is currently prescribed atorvastatin 80 mg nightly, in addition to Tricor and omega-3 fatty acids.  Triglycerides have reduced from 1000 down to 459, but still not at goal.  Three years ago his triglycerides were at 3000.  LDL is currently at 51, but VLDL is 68.  Total cholesterol is  "normal.  He denies any myalgia with use of statin therapy.  He is doing better with glucose monitoring and management.  Currently seeing endocrinology.  His last A1c 3 months ago was 9.8%.  His current continuous glucose meter is giving him an average of 6.8%.    Objective   Vital Signs:   Vitals:    11/18/24 1125   BP: 122/84   BP Location: Right leg   Pulse: 92   Temp: 98.3 °F (36.8 °C)   TempSrc: Temporal   SpO2: 97%   Weight: 101 kg (222 lb)   Height: 182.9 cm (72\")     Body mass index is 30.11 kg/m².    Wt Readings from Last 3 Encounters:   11/18/24 101 kg (222 lb)   10/28/24 102 kg (224 lb)   10/21/24 103 kg (227 lb 3.2 oz)     BP Readings from Last 3 Encounters:   11/18/24 122/84   10/28/24 117/68   10/21/24 122/78       Health Maintenance   Topic Date Due    Hepatitis B (1 of 3 - 19+ 3-dose series) Never done    COVID-19 Vaccine (3 - 2024-25 season) 09/01/2024    ZOSTER VACCINE (1 of 2) 11/18/2024 (Originally 10/5/2020)    INFLUENZA VACCINE  03/31/2025 (Originally 8/1/2024)    TDAP/TD VACCINES (1 - Tdap) 10/21/2025 (Originally 10/5/1989)    HEMOGLOBIN A1C  02/20/2025    DIABETIC EYE EXAM  04/04/2025    ANNUAL PHYSICAL  04/23/2025    DIABETIC FOOT EXAM  08/19/2025    BMI FOLLOWUP  08/19/2025    LUNG CANCER SCREENING  09/04/2025    LIPID PANEL  11/14/2025    COLORECTAL CANCER SCREENING  09/07/2027    HEPATITIS C SCREENING  Completed    Pneumococcal Vaccine 0-64  Completed    URINE MICROALBUMIN  Discontinued       Physical Exam  Vitals reviewed.   Constitutional:       General: He is not in acute distress.     Appearance: He is well-developed. He is obese. He is not ill-appearing.   HENT:      Head: Normocephalic and atraumatic.   Eyes:      General: No scleral icterus.        Right eye: No discharge.         Left eye: No discharge.      Extraocular Movements: Extraocular movements intact.      Conjunctiva/sclera: Conjunctivae normal.   Cardiovascular:      Rate and Rhythm: Normal rate and regular rhythm.      " Pulses: Normal pulses.      Heart sounds: No murmur heard.  Pulmonary:      Effort: Pulmonary effort is normal.      Breath sounds: Normal breath sounds. No wheezing, rhonchi or rales.   Musculoskeletal:         General: Normal range of motion.      Cervical back: Normal range of motion. No tenderness.      Right lower leg: No edema.      Left lower leg: No edema.   Skin:     General: Skin is warm and dry.   Neurological:      Mental Status: He is alert and oriented to person, place, and time.   Psychiatric:         Mood and Affect: Mood and affect normal.         Behavior: Behavior normal.         Thought Content: Thought content normal.         Judgment: Judgment normal.         Result Review :  The following data was reviewed by: JOSE RAUL Leung on 11/18/2024:    Clinical Support on 11/14/2024   Component Date Value    Glucose 11/14/2024 130 (H)     BUN 11/14/2024 13     Creatinine 11/14/2024 0.81     Sodium 11/14/2024 135 (L)     Potassium 11/14/2024 3.8     Chloride 11/14/2024 99     CO2 11/14/2024 22.6     Calcium 11/14/2024 9.5     Total Protein 11/14/2024 7.2     Albumin 11/14/2024 4.3     ALT (SGPT) 11/14/2024 23     AST (SGOT) 11/14/2024 19     Alkaline Phosphatase 11/14/2024 33 (L)     Total Bilirubin 11/14/2024 0.3     Globulin 11/14/2024 2.9     A/G Ratio 11/14/2024 1.5     BUN/Creatinine Ratio 11/14/2024 16.0     Anion Gap 11/14/2024 13.4     eGFR 11/14/2024 104.8     Total Cholesterol 11/14/2024 144     Triglycerides 11/14/2024 459 (H)     HDL Cholesterol 11/14/2024 25 (L)     LDL Cholesterol  11/14/2024 51     VLDL Cholesterol 11/14/2024 68 (H)     LDL/HDL Ratio 11/14/2024 1.09     25 Hydroxy, Vitamin D 11/14/2024 21.3 (L)        Procedures        Assessment and Plan   Diagnoses and all orders for this visit:    1. Insomnia, unspecified type (Primary)  -     traZODone (DESYREL) 150 MG tablet; Take 1 tablet by mouth Every Night.  Dispense: 90 tablet; Refill: 0    2. Hypertriglyceridemia  -      Evolocumab (REPATHA) solution prefilled syringe injection; Inject 1 mL under the skin into the appropriate area as directed Every 14 (Fourteen) Days.  Dispense: 6 mL; Refill: 1    3. Dyslipidemia, goal LDL below 70  -     Evolocumab (REPATHA) solution prefilled syringe injection; Inject 1 mL under the skin into the appropriate area as directed Every 14 (Fourteen) Days.  Dispense: 6 mL; Refill: 1    4. Vitamin D deficiency  -     vitamin D (ERGOCALCIFEROL) 1.25 MG (91083 UT) capsule capsule; Take 1 capsule by mouth 1 (One) Time Per Week.  Dispense: 13 capsule; Refill: 0          FOLLOW UP  Return in about 3 months (around 2/18/2025) for Next scheduled follow up.    Patient did call the office after leaving to verify that he is taking trazodone 150 mg nightly.  He is getting good sleep 5 out of 7 nights per week so we will continue this dose for now.  Follow-up with any changes.    In regards to his dyslipidemia and hypertriglyceridemia, triglycerides are still modestly elevated despite triple therapy with high-dose statin, Tricor, and fish oil.  I am going to see if we can get Repatha as an alternative for treatment and hopefully reduce pill burden with better outcomes.  Advised patient that PA will likely be needed but with history of triglycerides exceeding 3000, and more recently greater than 1000 I do not see why we would not be able to get this approved.    Continue vitamin D weekly for vitamin D deficiency.    Patient was given instructions and counseling regarding his condition or for health maintenance advice. Please see specific information pulled into the AVS if appropriate.       Ann Baker, APRN  11/18/24  13:43 EST    CURRENT & DISCONTINUED MEDICATIONS  Current Outpatient Medications   Medication Instructions    aspirin 81 mg, Oral, Daily    atorvastatin (LIPITOR) 80 mg, Oral, Daily    BD Pen Needle Bharati 2nd Gen 32G X 4 MM misc See Admin Instructions    clopidogrel (PLAVIX) 75 mg, Daily     Continuous Glucose  (Dexcom G7 ) device 1 each, Not Applicable, Continuous    Continuous Glucose Sensor (Dexcom G7 Sensor) misc 1 each, Not Applicable, Continuous    empagliflozin (JARDIANCE) 10 mg, Daily    EQL Omega 3 Fish Oil 1,000 mg, Oral, Daily    Evolocumab (REPATHA) 140 mg, Subcutaneous, Every 14 Days    famotidine (PEPCID) 40 MG tablet 1 tablet, Daily    fenofibrate (TRICOR) 145 mg, Oral, Daily    Insulin Lispro, 1 Unit Dial, (HUMALOG) 100 UNIT/ML solution pen-injector SSI - 4 units (141-180), 6 units (181-220), 8 units (221-260), 10 units (261-300), 12 units (301-350), 14 units (351-400), 16 units (401-450)    Lantus SoloStar 100 UNIT/ML injection pen INJECT 22 UNITS UNDER THE SKIN AT BEDTIME, INCREASE BY 2 UNITS EVERY 3 DAYS UNTIL FASTING BLOOD SUGAR IS LESS THAN 140 NOT EXCEED 50 UNITS A DAY    lisinopril (PRINIVIL,ZESTRIL) 20 mg, Oral, Daily    metoprolol tartrate (LOPRESSOR) 25 MG tablet 1 tablet, 2 Times Daily    nitroglycerin (NITROSTAT) 0.4 MG SL tablet     OneTouch Verio test strip 1 each, Other, 4 Times Daily Before Meals & Nightly, Use as instructed    oxyCODONE-acetaminophen (PERCOCET) 5-325 MG per tablet 1 tablet    Ozempic, 2 MG/DOSE, 8 MG/3ML solution pen-injector INJECT 2 MG SUBCUTANEOUS ONCE A WEEKY    tiZANidine (ZANAFLEX) 4 MG tablet Take 1 tablet 3 times a day by oral route as needed for 30 days.    traZODone (DESYREL) 150 mg, Oral, Nightly    triamcinolone (KENALOG) 0.1 % cream APPLY A THIN LAYER TO AFFECTED AREA(S) TWICE DAILY    vitamin D (ERGOCALCIFEROL) 50,000 Units, Oral, Weekly       Medications Discontinued During This Encounter   Medication Reason    polyethylene glycol (GoLYTELY) 236 g solution Patient Reported Not Taking    ondansetron (Zofran) 4 MG tablet Patient Reported Not Taking    Narcan 4 MG/0.1ML nasal spray Patient Reported Not Taking    amantadine (SYMMETREL) 100 MG tablet Patient Reported Not Taking    vitamin D (ERGOCALCIFEROL) 1.25 MG (23434 UT)  capsule capsule Reorder    traZODone (DESYREL) 150 MG tablet Reorder

## 2024-12-02 ENCOUNTER — TELEPHONE (OUTPATIENT)
Dept: GASTROENTEROLOGY | Facility: CLINIC | Age: 54
End: 2024-12-02
Payer: COMMERCIAL

## 2024-12-02 DIAGNOSIS — E78.2 MIXED HYPERLIPIDEMIA: ICD-10-CM

## 2024-12-02 DIAGNOSIS — E78.1 HYPERTRIGLYCERIDEMIA: ICD-10-CM

## 2024-12-02 RX ORDER — FENOFIBRATE 145 MG/1
145 TABLET, COATED ORAL DAILY
Qty: 90 TABLET | Refills: 0 | Status: SHIPPED | OUTPATIENT
Start: 2024-12-02

## 2024-12-02 NOTE — TELEPHONE ENCOUNTER
Patient called in stating that he has questions about his procedure, prep, and meds to stop. Please call patient to discuss this.

## 2024-12-03 NOTE — TELEPHONE ENCOUNTER
Called patient to discuss prep and procedure. Went over prep brochure with patient and answered any questions he had. Sent my chart message to patient with Brochure attached. Patient gave verbal understanding.

## 2024-12-05 NOTE — TELEPHONE ENCOUNTER
Called to check status on clearance - Spoke with Yuridia - she states the clearance is being addressed and will be sent back today

## 2024-12-09 NOTE — TELEPHONE ENCOUNTER
Called cardiologist office to check on clearance - have not rcv'd fax. Spoke with Rosalva - states clearance was addressed 12.5 - pt cleared from cardiac standpoint and okay to hold plavix for 5 days. Will place as high priority and have faxed.    - Called pt -states he was already told by Card to hold plavix for 5 days and to use 81mg Aspirin as Bridge so he has held

## 2024-12-13 NOTE — PRE-PROCEDURE INSTRUCTIONS
Rescheduled.  Confirmed new date and arrival time of 1200.  Hold Plavix for 5 days prior to procedure.  Cardiac clearance noted in chart.

## 2024-12-18 ENCOUNTER — TELEPHONE (OUTPATIENT)
Dept: GASTROENTEROLOGY | Facility: CLINIC | Age: 54
End: 2024-12-18
Payer: COMMERCIAL

## 2024-12-18 NOTE — TELEPHONE ENCOUNTER
Contacted pt to see if pt would like to r/s colonoscopy pt declined at this time and said he would call the office when he was ready to r/s procedure. Not following through with ordered procedures, testing, or follow-up visits could lead to serious complications that may be life-threatening, including a risk of incurable cancer if not found with early detection.   Cancelled pts follow up appt, pt stated he will call the office if he has any gastro issues.

## 2025-01-09 NOTE — TELEPHONE ENCOUNTER
Pt called to reschedule procedure from 12.12.24. Pt states his bowel prep was never sent in for that date. Pt will need 4L bowel prep sent to pharmacy. Pt also needs cardiac clearance (Greta Lopez with [facility]) and BT clearance from them as well (Plavix). Bowel prep instructions have been sent but the prep needs to be sent to the pharmacy.

## 2025-01-09 NOTE — TELEPHONE ENCOUNTER
Cardiac clearance and BT clearance has been sent to JOSE RAUL Martell with [facility]. Pt is aware to hold Ozempic 7 days prior to procedure. I have added clearances in the book.

## 2025-02-18 ENCOUNTER — TELEPHONE (OUTPATIENT)
Dept: GASTROENTEROLOGY | Facility: CLINIC | Age: 55
End: 2025-02-18
Payer: COMMERCIAL

## 2025-02-18 NOTE — TELEPHONE ENCOUNTER
Pt contacted the office wanting to confirm his arrival time for his colonoscopy, confirmed the est arrival time was 12PM.  Pt also had questions regarding medications to hold before his procedure, I transferred pt to [facility] to get medication questions answered.

## 2025-02-19 DIAGNOSIS — G47.00 INSOMNIA, UNSPECIFIED TYPE: ICD-10-CM

## 2025-02-19 NOTE — PRE-PROCEDURE INSTRUCTIONS
"Instructed on date and arrival time of 1200. Instructed that arrival time is not their procedure time but allows time to prepare for procedure.  Come to entrance \"C\". Must have  over age 18 to drive home.  May have two visitors; however, children under 12 must stay in waiting room.  Discussed clear liquid diet (no red or purple), bowel prep, and NPO.  May take medications as usual except for blood thinners, diabetic medications, and weight loss medications.  Verbalized understanding of instructions given.  Instructed to call for questions or concerns.  Hold Ozempic for one week prior to procedure.  Cardiac clearance noted in chart.  "

## 2025-02-20 RX ORDER — TRAZODONE HYDROCHLORIDE 150 MG/1
150 TABLET ORAL NIGHTLY
Qty: 30 TABLET | Refills: 0 | Status: SHIPPED | OUTPATIENT
Start: 2025-02-20

## 2025-02-25 ENCOUNTER — ANESTHESIA EVENT (OUTPATIENT)
Dept: GASTROENTEROLOGY | Facility: HOSPITAL | Age: 55
End: 2025-02-25
Payer: COMMERCIAL

## 2025-02-25 NOTE — ANESTHESIA PREPROCEDURE EVALUATION
" Anesthesia Evaluation     Patient summary reviewed and Nursing notes reviewed   NPO Solid Status: > 8 hours  NPO Liquid Status: > 2 hours           Airway   Mallampati: I  TM distance: <3 FB  Neck ROM: full  No difficulty expected and Large neck circumference  Dental    (+) lower dentures and upper dentures    Comment: Pt stated he was unable to remove upper dentures. Verbalized he understood anesthesia is not liable for dentures should they be damaged during procedure.     Pulmonary - normal exam    breath sounds clear to auscultation  (+) a smoker (current) Current, cigarettes,shortness of breath, sleep apnea on CPAP  Cardiovascular - normal exam  Exercise tolerance: good (4-7 METS)    ECG reviewed  Rhythm: regular  Rate: normal    (+) hypertension well controlled, past MI (2023)  >12 months, CAD, cardiac stents (2023) more than 12 months ago , hyperlipidemia      Neuro/Psych  GI/Hepatic/Renal/Endo    (+) obesity, GERD well controlled, diabetes mellitus type 2 well controlled    Musculoskeletal     Abdominal   (+) obese    Abdomen: soft.   Substance History      OB/GYN          Other   arthritis,     ROS/Med Hx Other: + cologuard     83 pack year history of smoking  EKG 2021 - NSR  Nuclear Stress test 2021 - abnormal test, EF 29%  MI in 2018 and 2020 with stenting    Heart [facility] in 2023 - \"severe CAD\"    Cardiac Clearance 1/9/2025 - OK to hold plavix for 5 days prior    Called patient 2/25/25 @1342 - Last dose Ozempic 2/13/25.  - verified in preop       EKG 02/22/21: HR 77, SR    Egd/Colonoscopy 12/12/24 - canceled because wasn't off plavix                    Anesthesia Plan    ASA 4     general   total IV anesthesia  (Total IV Anesthesia    Patient understands anesthesia not responsible for dental damage.      Discussed risks with pt including aspiration, allergic reactions, apnea, advanced airway placement. Pt verbalized understanding. All questions answered.     )  intravenous induction     Anesthetic plan, " risks, benefits, and alternatives have been provided, discussed and informed consent has been obtained with: patient and spouse/significant other.  Pre-procedure education provided  Plan discussed with CRNA.        CODE STATUS:

## 2025-02-26 ENCOUNTER — ANESTHESIA (OUTPATIENT)
Dept: GASTROENTEROLOGY | Facility: HOSPITAL | Age: 55
End: 2025-02-26
Payer: COMMERCIAL

## 2025-02-26 ENCOUNTER — HOSPITAL ENCOUNTER (OUTPATIENT)
Facility: HOSPITAL | Age: 55
Setting detail: HOSPITAL OUTPATIENT SURGERY
Discharge: HOME | End: 2025-02-26
Attending: INTERNAL MEDICINE | Admitting: INTERNAL MEDICINE
Payer: COMMERCIAL

## 2025-02-26 VITALS
TEMPERATURE: 97.1 F | WEIGHT: 226.85 LBS | RESPIRATION RATE: 15 BRPM | DIASTOLIC BLOOD PRESSURE: 77 MMHG | BODY MASS INDEX: 30.77 KG/M2 | HEART RATE: 69 BPM | OXYGEN SATURATION: 94 % | SYSTOLIC BLOOD PRESSURE: 123 MMHG

## 2025-02-26 DIAGNOSIS — R19.5 POSITIVE COLORECTAL CANCER SCREENING USING COLOGUARD TEST: ICD-10-CM

## 2025-02-26 LAB — GLUCOSE BLDC GLUCOMTR-MCNC: 160 MG/DL (ref 70–99)

## 2025-02-26 PROCEDURE — 25010000002 PROPOFOL 10 MG/ML EMULSION: Performed by: NURSE ANESTHETIST, CERTIFIED REGISTERED

## 2025-02-26 PROCEDURE — 88305 TISSUE EXAM BY PATHOLOGIST: CPT | Performed by: INTERNAL MEDICINE

## 2025-02-26 PROCEDURE — 25810000003 LACTATED RINGERS PER 1000 ML: Performed by: MARRIAGE & FAMILY THERAPIST

## 2025-02-26 PROCEDURE — 82948 REAGENT STRIP/BLOOD GLUCOSE: CPT | Performed by: MARRIAGE & FAMILY THERAPIST

## 2025-02-26 PROCEDURE — 25010000002 LIDOCAINE PF 2% 2 % SOLUTION: Performed by: NURSE ANESTHETIST, CERTIFIED REGISTERED

## 2025-02-26 PROCEDURE — 45385 COLONOSCOPY W/LESION REMOVAL: CPT | Performed by: INTERNAL MEDICINE

## 2025-02-26 RX ORDER — LIDOCAINE HYDROCHLORIDE 20 MG/ML
INJECTION, SOLUTION EPIDURAL; INFILTRATION; INTRACAUDAL; PERINEURAL AS NEEDED
Status: DISCONTINUED | OUTPATIENT
Start: 2025-02-26 | End: 2025-02-26 | Stop reason: SURG

## 2025-02-26 RX ORDER — SODIUM CHLORIDE, SODIUM LACTATE, POTASSIUM CHLORIDE, CALCIUM CHLORIDE 600; 310; 30; 20 MG/100ML; MG/100ML; MG/100ML; MG/100ML
30 INJECTION, SOLUTION INTRAVENOUS CONTINUOUS
Status: DISCONTINUED | OUTPATIENT
Start: 2025-02-26 | End: 2025-02-26 | Stop reason: HOSPADM

## 2025-02-26 RX ORDER — PROPOFOL 10 MG/ML
VIAL (ML) INTRAVENOUS AS NEEDED
Status: DISCONTINUED | OUTPATIENT
Start: 2025-02-26 | End: 2025-02-26 | Stop reason: SURG

## 2025-02-26 RX ADMIN — SODIUM CHLORIDE, POTASSIUM CHLORIDE, SODIUM LACTATE AND CALCIUM CHLORIDE: 600; 310; 30; 20 INJECTION, SOLUTION INTRAVENOUS at 10:16

## 2025-02-26 RX ADMIN — LIDOCAINE HYDROCHLORIDE 100 MG: 20 INJECTION, SOLUTION INTRAVENOUS at 10:20

## 2025-02-26 RX ADMIN — PROPOFOL 30 MG: 10 INJECTION, EMULSION INTRAVENOUS at 10:22

## 2025-02-26 RX ADMIN — PROPOFOL 250 MCG/KG/MIN: 10 INJECTION, EMULSION INTRAVENOUS at 10:21

## 2025-02-26 RX ADMIN — PROPOFOL 100 MG: 10 INJECTION, EMULSION INTRAVENOUS at 10:20

## 2025-02-26 NOTE — NURSING NOTE
ABDOMINAL PRESSURE APPLIED PER MD INSTRUCTION TO ASSIST WITH ADVANCEMENT OF SCOPE.  Natalia Sena RN

## 2025-02-26 NOTE — ANESTHESIA POSTPROCEDURE EVALUATION
Patient: Manuel Demarco    Procedure Summary       Date: 02/26/25 Room / Location: [facility] ENDOSCOPY 5 / [facility]    Anesthesia Start: 1016 Anesthesia Stop: 1102    Procedure: COLONOSCOPY WITH HOT SNARE POLYPECTOMY Diagnosis:       Positive colorectal cancer screening using Cologuard test      (Positive colorectal cancer screening using Cologuard test [R19.5])    Surgeons: Carmelo Miller MD Provider: Megan Gifford CRNA    Anesthesia Type: general ASA Status: 4            Anesthesia Type: general    Vitals  Vitals Value Taken Time   /83 02/26/25 1121   Temp 36.2 °C (97.1 °F) 02/26/25 1100   Pulse 65 02/26/25 1122   Resp 15 02/26/25 1115   SpO2 95 % 02/26/25 1122   Vitals shown include unfiled device data.        Post Anesthesia Care and Evaluation    Post-procedure mental status: acceptable.  Pain management: satisfactory to patient    Airway patency: patent  Anesthetic complications: No anesthetic complications    Cardiovascular status: acceptable  Respiratory status: acceptable    Comments: Per chart review

## 2025-02-26 NOTE — H&P
Pre Procedure History & Physical    Chief Complaint: Positive Cologuard test    HPI: Patient is 54 years old male with known history of CAD, diabetes mellitus, hypertension, hyperlipidemia, s/p cardiac stents x 6 on baby aspirin and Plavix.  Last dose was on 2025 presented today for positive Cologuard test.  Patient's mother had a history of colon cancer at the age of 76.  He denies any obvious GI symptoms of bleed, altered bowel habits, unintentional significant weight loss or poor appetite.  Patient does take Ozempic for diabetes melitis and currently off of the medication for more than a week    Past Medical History:   Past Medical History:   Diagnosis Date    Allergic 2011    Arthritis     Coronary artery disease     Diabetes mellitus     Not sure    GERD (gastroesophageal reflux disease)     Hyperlipidemia     Hypertension     Low back pain     Myocardial infarction     Heart attack 2018 and again may 2020    Pancreatitis     Not sure       Past Surgical History:  Past Surgical History:   Procedure Laterality Date    CARDIAC CATHETERIZATION      Not sure i have had 3 or 4    COLONOSCOPY N/A 2024    Procedure: COLONOSCOPY; A flexible tube with camera and light at the end will pass from the rear end to visualize the large bowel with possible removal of protruded tissue to send for testing.;  Surgeon: Carmelo Miller MD;  Location: [facility] ENDOSCOPY;  Service: Gastroenterology;  Laterality: N/A;    CORONARY STENT PLACEMENT      Have 6 stints    HERNIA REPAIR      I think     JOINT REPLACEMENT      UMBILICAL HERNIA REPAIR      I think        Family History:  Family History   Problem Relation Age of Onset    Colon cancer Mother     Arthritis Mother     Cancer Mother     COPD Mother     Diabetes Mother     Heart disease Mother          in     Hyperlipidemia Mother     Hypertension Mother     Alcohol abuse Father          in     Arthritis Father     Cancer Father      Diabetes Father     Heart disease Father          in     Hyperlipidemia Father     Hypertension Father     Alcohol abuse Brother         Both dead    Hyperlipidemia Brother     Hypertension Brother     Liver disease Brother     Arthritis Brother     Heart disease Brother          in     Cancer Brother        Social History:   reports that he has been smoking cigarettes. He started smoking about 43 years ago. He has a 86.2 pack-year smoking history. He has been exposed to tobacco smoke. He has never used smokeless tobacco. He reports that he does not currently use alcohol after a past usage of about 3.0 standard drinks of alcohol per week. He reports that he does not use drugs.    Medications:   Medications Prior to Admission   Medication Sig Dispense Refill Last Dose/Taking    aspirin 81 MG EC tablet Take 1 tablet by mouth Daily. 90 tablet 3 Past Week    atorvastatin (LIPITOR) 80 MG tablet Take 1 tablet by mouth Daily. 90 tablet 0 Past Week    Evolocumab (REPATHA) solution prefilled syringe injection Inject 1 mL under the skin into the appropriate area as directed Every 14 (Fourteen) Days. 6 mL 1 Past Week    fenofibrate (TRICOR) 145 MG tablet TAKE 1 TABLET BY MOUTH DAILY 90 tablet 0 Past Week    traZODone (DESYREL) 150 MG tablet TAKE 1 TABLET BY MOUTH EVERY NIGHT 30 tablet 0 2025    BD Pen Needle Bharati 2nd Gen 32G X 4 MM misc See Admin Instructions. with insulin       clopidogrel (PLAVIX) 75 MG tablet Take 1 tablet by mouth Daily.   2025    Continuous Glucose  (Dexcom G7 ) device Use 1 each Continuous. 1 each 0     Continuous Glucose Sensor (Dexcom G7 Sensor) misc Use 1 each Continuous. 6 each 1     empagliflozin (JARDIANCE) 10 MG tablet tablet Take 1 tablet by mouth Daily. Indications: Type 2 Diabetes   2025    famotidine (PEPCID) 40 MG tablet Take 1 tablet by mouth Daily.       Insulin Lispro, 1 Unit Dial, (HUMALOG) 100 UNIT/ML solution pen-injector SSI - 4 units  (141-180), 6 units (181-220), 8 units (221-260), 10 units (261-300), 12 units (301-350), 14 units (351-400), 16 units (401-450)       Lantus SoloStar 100 UNIT/ML injection pen INJECT 22 UNITS UNDER THE SKIN AT BEDTIME, INCREASE BY 2 UNITS EVERY 3 DAYS UNTIL FASTING BLOOD SUGAR IS LESS THAN 140 NOT EXCEED 50 UNITS A DAY 15 mL 0     lisinopril (PRINIVIL,ZESTRIL) 20 MG tablet Take 1 tablet by mouth Daily. 90 tablet 0     metoprolol tartrate (LOPRESSOR) 25 MG tablet Take 1 tablet by mouth 2 (Two) Times a Day.       nitroglycerin (NITROSTAT) 0.4 MG SL tablet        Omega-3 Fatty Acids (EQL Omega 3 Fish Oil) 1000 MG capsule delayed-release Take 1,000 mg by mouth Daily. 90 capsule 0     OneTouch Verio test strip 1 each by Other route 4 (Four) Times a Day Before Meals & at Bedtime. Use as instructed 400 each 3     oxyCODONE-acetaminophen (PERCOCET) 5-325 MG per tablet Take 1 tablet by mouth.       Ozempic, 2 MG/DOSE, 8 MG/3ML solution pen-injector INJECT 2 MG SUBCUTANEOUS ONCE A WEEKY   2/18/2025    polyethylene glycol (GoLYTELY) 236 g solution Take per office instructions 4000 mL 0     tiZANidine (ZANAFLEX) 4 MG tablet Take 1 tablet 3 times a day by oral route as needed for 30 days.       triamcinolone (KENALOG) 0.1 % cream APPLY A THIN LAYER TO AFFECTED AREA(S) TWICE DAILY       vitamin D (ERGOCALCIFEROL) 1.25 MG (30950 UT) capsule capsule Take 1 capsule by mouth 1 (One) Time Per Week. 13 capsule 0        Allergies:  Cephalexin      Objective     Blood pressure 137/83, pulse 79, temperature 97.1 °F (36.2 °C), temperature source Temporal, resp. rate 16, weight 103 kg (226 lb 13.7 oz), SpO2 96%.    Physical Exam   Constitutional: Pt is oriented to person, place, and time and well-developed, well-nourished, and in no distress.   Mouth/Throat: Oropharynx is clear and moist.   Neck: Normal range of motion.   Cardiovascular: Normal rate, regular rhythm and normal heart sounds.    Pulmonary/Chest: Effort normal and breath sounds  normal.   Abdominal: Soft. Nontender  Skin: Skin is warm and dry.   Psychiatric: Mood, memory, affect and judgment normal.     Assessment & Plan     Diagnosis:    Positive Cologuard test  Mother with history of colon cancer at the age of 76 years    Anticipated Surgical Procedure:    Colonoscopy    The risks, benefits, and alternatives of this procedure have been discussed with the patient or the responsible party- the patient understands and agrees to proceed.        Electronically signed by Carmelo Miller MD, 02/26/25, 9:57 AM EST.

## 2025-02-27 LAB
CYTO UR: NORMAL
LAB AP CASE REPORT: NORMAL
LAB AP CLINICAL INFORMATION: NORMAL
PATH REPORT.FINAL DX SPEC: NORMAL
PATH REPORT.GROSS SPEC: NORMAL

## 2025-03-03 ENCOUNTER — TELEPHONE (OUTPATIENT)
Dept: GASTROENTEROLOGY | Facility: CLINIC | Age: 55
End: 2025-03-03
Payer: COMMERCIAL

## 2025-03-03 NOTE — TELEPHONE ENCOUNTER
----- Message from Carmelo Miller sent at 2/28/2025  9:55 AM EST -----  Patient has poor colon prep and recommendation was to reschedule colonoscopy in 1 month

## 2025-03-10 DIAGNOSIS — E78.2 MIXED HYPERLIPIDEMIA: ICD-10-CM

## 2025-03-10 DIAGNOSIS — E78.1 HYPERTRIGLYCERIDEMIA: ICD-10-CM

## 2025-03-10 RX ORDER — FENOFIBRATE 145 MG/1
145 TABLET, COATED ORAL DAILY
Qty: 30 TABLET | Refills: 0 | Status: SHIPPED | OUTPATIENT
Start: 2025-03-10 | End: 2025-03-17 | Stop reason: SDUPTHER

## 2025-03-17 ENCOUNTER — OFFICE VISIT (OUTPATIENT)
Dept: FAMILY MEDICINE CLINIC | Facility: CLINIC | Age: 55
End: 2025-03-17
Payer: COMMERCIAL

## 2025-03-17 VITALS
HEART RATE: 104 BPM | TEMPERATURE: 97.5 F | HEIGHT: 72 IN | DIASTOLIC BLOOD PRESSURE: 68 MMHG | WEIGHT: 224 LBS | BODY MASS INDEX: 30.34 KG/M2 | SYSTOLIC BLOOD PRESSURE: 110 MMHG | OXYGEN SATURATION: 98 %

## 2025-03-17 DIAGNOSIS — Z71.6 ENCOUNTER FOR SMOKING CESSATION COUNSELING: ICD-10-CM

## 2025-03-17 DIAGNOSIS — F17.219 CIGARETTE NICOTINE DEPENDENCE WITH NICOTINE-INDUCED DISORDER: ICD-10-CM

## 2025-03-17 DIAGNOSIS — E78.2 MIXED HYPERLIPIDEMIA: ICD-10-CM

## 2025-03-17 DIAGNOSIS — E11.65 TYPE 2 DIABETES MELLITUS WITH HYPERGLYCEMIA, WITH LONG-TERM CURRENT USE OF INSULIN: ICD-10-CM

## 2025-03-17 DIAGNOSIS — Z95.5 H/O HEART ARTERY STENT: ICD-10-CM

## 2025-03-17 DIAGNOSIS — Z79.4 TYPE 2 DIABETES MELLITUS WITH HYPERGLYCEMIA, WITH LONG-TERM CURRENT USE OF INSULIN: ICD-10-CM

## 2025-03-17 DIAGNOSIS — G47.00 INSOMNIA, UNSPECIFIED TYPE: ICD-10-CM

## 2025-03-17 DIAGNOSIS — I10 ESSENTIAL HYPERTENSION: Primary | ICD-10-CM

## 2025-03-17 DIAGNOSIS — R06.02 SOBOE (SHORTNESS OF BREATH ON EXERTION): ICD-10-CM

## 2025-03-17 DIAGNOSIS — E55.9 VITAMIN D DEFICIENCY: ICD-10-CM

## 2025-03-17 DIAGNOSIS — E78.5 DYSLIPIDEMIA, GOAL LDL BELOW 70: ICD-10-CM

## 2025-03-17 DIAGNOSIS — Z87.898 HISTORY OF PALPITATIONS: ICD-10-CM

## 2025-03-17 DIAGNOSIS — J43.9 PULMONARY EMPHYSEMA, UNSPECIFIED EMPHYSEMA TYPE: ICD-10-CM

## 2025-03-17 DIAGNOSIS — E78.1 HYPERTRIGLYCERIDEMIA: ICD-10-CM

## 2025-03-17 RX ORDER — ACYCLOVIR 400 MG/1
1 TABLET ORAL CONTINUOUS
Qty: 6 EACH | Refills: 1 | Status: SHIPPED | OUTPATIENT
Start: 2025-03-17

## 2025-03-17 RX ORDER — AMANTADINE HYDROCHLORIDE 100 MG/1
100 TABLET ORAL
COMMUNITY
Start: 2025-01-21

## 2025-03-17 RX ORDER — ERGOCALCIFEROL 1.25 MG/1
50000 CAPSULE, LIQUID FILLED ORAL WEEKLY
Qty: 13 CAPSULE | Refills: 1 | Status: SHIPPED | OUTPATIENT
Start: 2025-03-17

## 2025-03-17 RX ORDER — NICOTINE 21 MG/24HR
1 PATCH, TRANSDERMAL 24 HOURS TRANSDERMAL EVERY 24 HOURS
Qty: 28 EACH | Refills: 0 | Status: SHIPPED | OUTPATIENT
Start: 2025-03-17 | End: 2025-03-24 | Stop reason: SDUPTHER

## 2025-03-17 RX ORDER — ATORVASTATIN CALCIUM 80 MG/1
80 TABLET, FILM COATED ORAL DAILY
Qty: 90 TABLET | Refills: 1 | Status: SHIPPED | OUTPATIENT
Start: 2025-03-17

## 2025-03-17 RX ORDER — TRAZODONE HYDROCHLORIDE 150 MG/1
150 TABLET ORAL NIGHTLY
Qty: 90 TABLET | Refills: 1 | Status: SHIPPED | OUTPATIENT
Start: 2025-03-17

## 2025-03-17 RX ORDER — EMPAGLIFLOZIN 25 MG/1
1 TABLET, FILM COATED ORAL DAILY
COMMUNITY
Start: 2025-01-22

## 2025-03-17 RX ORDER — LISINOPRIL 20 MG/1
20 TABLET ORAL DAILY
Qty: 90 TABLET | Refills: 1 | Status: SHIPPED | OUTPATIENT
Start: 2025-03-17

## 2025-03-17 RX ORDER — FENOFIBRATE 145 MG/1
145 TABLET, COATED ORAL DAILY
Qty: 90 TABLET | Refills: 1 | Status: SHIPPED | OUTPATIENT
Start: 2025-03-17

## 2025-03-17 RX ORDER — FLUTICASONE PROPIONATE AND SALMETEROL 250; 50 UG/1; UG/1
1 POWDER RESPIRATORY (INHALATION)
Qty: 180 EACH | Refills: 1 | Status: SHIPPED | OUTPATIENT
Start: 2025-03-17

## 2025-03-17 NOTE — PROGRESS NOTES
Chief Complaint  Hyperlipidemia (Refills ) and Insomnia      Onset was at an unknown time.   Symptoms include: joint pain, neck pain, visual change and weakness.   Pertinent negative symptoms include no abdominal pain, no anorexia, no change in stool, no chest pain, no chills, no congestion, no cough, no diaphoresis, no fatigue, no fever, no headaches, no joint swelling, no myalgias, no nausea, no numbness, no rash, no sore throat, no swollen glands, no dysuria, no vertigo and no vomiting.     Manuel Demarco is a 54 y.o. male who presents to [facility] with a past medical history of    Past Medical History:   Diagnosis Date    Allergic 2011    Arthritis 2000    Coronary artery disease 2018    Diabetes mellitus     Not sure    GERD (gastroesophageal reflux disease)     Hernia     Hyperlipidemia     Hypertension     Low back pain     Myocardial infarction 2018    Heart attack feb 2018 and again may 2020    Pancreatitis     Not sure       History of Present Illness  The patient is a 54-year-old male who presents to the office today for follow-up on chronic health conditions and medication refills.    He reports satisfactory control of his blood glucose levels, with no recent episodes of hypoglycemia since discontinuing Lantus. He continues to take Jardiance 25 mg. His last consultation with Ms. Suresh was on 01/22/2025, during which his A1c was assessed around 7% which is a significant improvement from previous. He has an upcoming ophthalmological examination scheduled for the beginning of the next month.    He is currently on Repatha for cholesterol management, administered biweekly, and reports no issues with insurance coverage.    For hypertension, he is on lisinopril 20 mg daily, which he tolerates well. He reports no chest pain or palpitations. He experiences shortness of breath, particularly when ascending from the basement. He has not undergone an echocardiogram since his last  "stress test in 2021.  Patient has a known history of coronary artery disease status post having a STEMI with a  drug-eluting stent to the right coronary artery in 2018. He presented back to the hospital in 2023. He was taken to the Cath Lab. Had a 30 to 40% proximal LAD 80 to 90% ramus and 100% proximal circumflex and RCA proximal to mid distal severe disease with in-stent restenosis.  He has a history of smoking for over 40 years and has not had a pulmonary function test. He is also on aspirin daily.    He is on trazodone 150 mg at night for sleep, which he finds effective. He is also on weekly vitamin D supplements.    He has attempted colonoscopy twice due to abnormal Cologuard results, but both attempts were unsuccessful due to inadequate preparation.     Supplemental Information  He is under the care of a cardiologist for palpitations and had a consultation last month. He is currently receiving pain management for back pain.    SOCIAL HISTORY  The patient currently smokes and has been smoking for over 40 years.    MEDICATIONS  Current: Jardiance, Repatha, lisinopril, aspirin, Plavix, Pepcid, Humalog, metoprolol, nitroglycerin, Percocet, Ozempic, tizanidine, Kenalog cream, amantadine, trazodone, vitamin D      Objective   Vital Signs:   Vitals:    03/17/25 1414   BP: 110/68   Pulse: 104   Temp: 97.5 °F (36.4 °C)   SpO2: 98%   Weight: 102 kg (224 lb)   Height: 182.9 cm (72\")     Body mass index is 30.38 kg/m².    Wt Readings from Last 3 Encounters:   03/17/25 102 kg (224 lb)   02/26/25 103 kg (226 lb 13.7 oz)   12/12/24 103 kg (227 lb 11.8 oz)     BP Readings from Last 3 Encounters:   03/17/25 110/68   02/26/25 123/77   12/12/24 126/80       Health Maintenance   Topic Date Due    Hepatitis B (1 of 3 - 19+ 3-dose series) Never done    ZOSTER VACCINE (1 of 2) Never done    COLORECTAL CANCER SCREENING  03/26/2025    DIABETIC EYE EXAM  04/04/2025    INFLUENZA VACCINE  03/31/2025 (Originally 7/1/2024)    TDAP/TD " VACCINES (1 - Tdap) 10/21/2025 (Originally 10/[birth year]/1989)    COVID-19 Vaccine (3 - 2024-25 season) 03/17/2026 (Originally 9/1/2024)    ANNUAL PHYSICAL  04/23/2025    HEMOGLOBIN A1C  07/22/2025    DIABETIC FOOT EXAM  08/19/2025    BMI FOLLOWUP  08/19/2025    URINE MICROALBUMIN-CREATININE RATIO (uACR)  08/20/2025    LUNG CANCER SCREENING  09/04/2025    LIPID PANEL  11/14/2025    HEPATITIS C SCREENING  Completed    Pneumococcal Vaccine 50+  Completed       Physical Exam  Vitals reviewed.   Constitutional:       General: He is not in acute distress.     Appearance: He is well-developed. He is obese. He is not ill-appearing.   HENT:      Head: Normocephalic and atraumatic.   Eyes:      General: No scleral icterus.        Right eye: No discharge.         Left eye: No discharge.      Extraocular Movements: Extraocular movements intact.      Conjunctiva/sclera: Conjunctivae normal.   Neck:      Thyroid: No thyromegaly.      Vascular: No carotid bruit.      Trachea: Trachea normal.   Cardiovascular:      Rate and Rhythm: Normal rate and regular rhythm.      Pulses: Normal pulses.      Heart sounds: No murmur heard.  Pulmonary:      Effort: Pulmonary effort is normal.      Breath sounds: Normal breath sounds. No wheezing, rhonchi or rales.   Abdominal:      General: Bowel sounds are normal. There is no distension.      Palpations: Abdomen is soft. There is no mass.      Tenderness: There is no abdominal tenderness.   Musculoskeletal:         General: Normal range of motion.      Cervical back: Normal range of motion and neck supple. No tenderness.      Right lower leg: No edema.      Left lower leg: No edema.   Lymphadenopathy:      Cervical: No cervical adenopathy.   Skin:     General: Skin is warm and dry.   Neurological:      Mental Status: He is alert and oriented to person, place, and time.   Psychiatric:         Mood and Affect: Mood and affect normal.         Behavior: Behavior normal.         Thought Content: Thought  content normal.         Judgment: Judgment normal.       Result Review :  The following data was reviewed by: JOSE RAUL Leung on 03/17/2025:    Common labs          8/20/2024    09:13 8/20/2024    09:20 9/11/2024    10:16 11/14/2024    08:54   Common Labs   Glucose 245   203  130    BUN 13    13    Creatinine 0.74    0.81    Sodium 131    135    Potassium 4.2    3.8    Chloride 96    99    Calcium 10.3    9.5    Albumin 4.5    4.3    Total Bilirubin 0.3    0.3    Alkaline Phosphatase 74    33    AST (SGOT) 22    19    ALT (SGPT) 41    23    WBC 10.60       Hemoglobin 16.6       Hematocrit 49.4       Platelets 240       Total Cholesterol 227    144    Triglycerides 1,003    459    HDL Cholesterol 29    25    LDL Cholesterol  75    51    Hemoglobin A1C 9.80       Microalbumin, Urine  <1.2      PSA 1.070       Uric Acid 3.2         Colonoscopy (02/26/2025 09:52)   Tissue Pathology Exam (02/26/2025 10:51)     Cologuard - , (09/07/2024 10:20)     Procedures        Assessment and Plan   Diagnoses and all orders for this visit:    1. Essential hypertension (Primary)  -     lisinopril (PRINIVIL,ZESTRIL) 20 MG tablet; Take 1 tablet by mouth Daily.  Dispense: 90 tablet; Refill: 1  -     CBC Auto Differential; Future  -     Comprehensive Metabolic Panel; Future  -     Lipid Panel; Future  -     TSH+Free T4; Future  -     Microalbumin / Creatinine Urine Ratio - Urine, Clean Catch; Future    2. Mixed hyperlipidemia  -     fenofibrate (TRICOR) 145 MG tablet; Take 1 tablet by mouth Daily.  Dispense: 90 tablet; Refill: 1  -     atorvastatin (LIPITOR) 80 MG tablet; Take 1 tablet by mouth Daily.  Dispense: 90 tablet; Refill: 1  -     Comprehensive Metabolic Panel; Future  -     Lipid Panel; Future    3. Dyslipidemia, goal LDL below 70  -     Evolocumab (REPATHA) solution prefilled syringe injection; Inject 1 mL under the skin into the appropriate area as directed Every 14 (Fourteen) Days.  Dispense: 6 mL; Refill: 1  -      Comprehensive Metabolic Panel; Future  -     Lipid Panel; Future    4. Hypertriglyceridemia  -     Evolocumab (REPATHA) solution prefilled syringe injection; Inject 1 mL under the skin into the appropriate area as directed Every 14 (Fourteen) Days.  Dispense: 6 mL; Refill: 1  -     fenofibrate (TRICOR) 145 MG tablet; Take 1 tablet by mouth Daily.  Dispense: 90 tablet; Refill: 1  -     Comprehensive Metabolic Panel; Future  -     Lipid Panel; Future    5. SOBOE (shortness of breath on exertion)  -     Adult Transthoracic Echo Complete W/ Cont if Necessary Per Protocol; Future    6. History of palpitations  -     Adult Transthoracic Echo Complete W/ Cont if Necessary Per Protocol; Future    7. H/O heart artery stent  -     Adult Transthoracic Echo Complete W/ Cont if Necessary Per Protocol; Future    8. Pulmonary emphysema, unspecified emphysema type  -     Fluticasone-Salmeterol (ADVAIR/WIXELA) 250-50 MCG/ACT DISKUS; Inhale 1 puff 2 (Two) Times a Day.  Dispense: 180 each; Refill: 1    9. Type 2 diabetes mellitus with hyperglycemia, with long-term current use of insulin  -     Continuous Glucose Sensor (Dexcom G7 Sensor) misc; Use 1 each Continuous.  Dispense: 6 each; Refill: 1    10. Vitamin D deficiency  -     vitamin D (ERGOCALCIFEROL) 1.25 MG (83672 UT) capsule capsule; Take 1 capsule by mouth 1 (One) Time Per Week.  Dispense: 13 capsule; Refill: 1  -     Vitamin D,25-Hydroxy; Future    11. Insomnia, unspecified type  -     traZODone (DESYREL) 150 MG tablet; Take 1 tablet by mouth Every Night.  Dispense: 90 tablet; Refill: 1    12. Cigarette nicotine dependence with nicotine-induced disorder  -     nicotine (NICODERM CQ) 21 MG/24HR patch; Place 1 patch on the skin as directed by provider Daily.  Dispense: 28 each; Refill: 0    13. Encounter for smoking cessation counseling  -     nicotine (NICODERM CQ) 21 MG/24HR patch; Place 1 patch on the skin as directed by provider Daily.  Dispense: 28 each; Refill:  0        Assessment & Plan  1. Diabetes Mellitus.  His A1c level has improved to 7.1 as of 01/22/2025. He is currently on Jardiance 25 mg. A prescription for Dexcom sensors will be provided. Fasting lab work will be ordered to monitor his cholesterol levels.    2. Hypercholesterolemia.  He is on Repatha every 2 weeks for cholesterol management. A prescription for Repatha will be sent to [facility].    3. Hypertension.  He is on lisinopril 20 mg daily for blood pressure management, which appears to be effective. A prescription for lisinopril will be sent to [facility].    4. Emphysema.  A CT scan of his chest conducted in September 2024 revealed moderate emphysematous changes. An echocardiogram will be ordered to rule out any cardiac involvement. If the echocardiogram results are normal, a pulmonary function test (PFT) will be considered to evaluate for potential emphysema or COPD secondary to nicotine dependence. A trial of Advair will be initiated, with instructions to administer 1 puff twice daily and rinse the mouth thoroughly post-use to prevent oral thrush. A prescription for Advair will be sent to [facility].    5. Smoking Cessation.  A prescription for a 21 mg nicotine patch will be provided for a duration of 4 weeks. He is advised to abstain from smoking while using the patch and to avoid wearing the patch during sleep unless he wakes up to smoke. He will provide an update on his progress with the nicotine patch after 4 weeks, at which point a reduction to a 14 mg patch may be considered.    Manuel Demarco  reports that he has been smoking cigarettes. He started smoking about 43 years ago. He has a 86.3 pack-year smoking history. He has been exposed to tobacco smoke. He has never used smokeless tobacco. I have educated him on the risk of diseases from using tobacco products such as cancer, COPD, and heart disease.     I advised him to quit and he is willing to quit. We have discussed the following method/s for  tobacco cessation:  Counseling and Prescription Medication.  Together we have set a quit date for 1 week from today.  He will follow up with me in 4 weeks or sooner to check on his progress.    I spent 4 minutes counseling the patient.      6. Medication Management.  Prescriptions for trazodone 150 mg at night, vitamin D weekly, Lipitor, Tricor, and Advair will be renewed for a period of 6 months. [facility].    7. Health Maintenance.  He has a scheduled eye exam at the beginning of next month. He will attempt another colonoscopy next year due to previous incomplete preparations.    Follow-up  The patient is scheduled for a follow-up visit in 6 months.                FOLLOW UP  Return in about 6 months (around 9/17/2025) for Annual physical, medication refills and fasting labs.    Patient was given instructions and counseling regarding his condition or for health maintenance advice. Please see specific information pulled into the AVS if appropriate.       Ann Baker, APRN  03/17/25  15:06 EDT    CURRENT & DISCONTINUED MEDICATIONS  Current Outpatient Medications   Medication Instructions    amantadine (SYMMETREL) 100 mg    aspirin 81 mg, Oral, Daily    atorvastatin (LIPITOR) 80 mg, Oral, Daily    BD Pen Needle Bharati 2nd Gen 32G X 4 MM misc See Admin Instructions    clopidogrel (PLAVIX) 75 mg, Daily    Continuous Glucose  (Dexcom G7 ) device 1 each, Not Applicable, Continuous    Continuous Glucose Sensor (Dexcom G7 Sensor) misc 1 each, Not Applicable, Continuous    Evolocumab (REPATHA) 140 mg, Subcutaneous, Every 14 Days    famotidine (PEPCID) 40 MG tablet 1 tablet, Daily    fenofibrate (TRICOR) 145 mg, Oral, Daily    Fluticasone-Salmeterol (ADVAIR/WIXELA) 250-50 MCG/ACT DISKUS 1 puff, Inhalation, 2 Times Daily - RT    Insulin Lispro, 1 Unit Dial, (HUMALOG) 100 UNIT/ML solution pen-injector SSI - 4 units (141-180), 6 units (181-220), 8 units (221-260), 10 units  (261-300), 12 units (301-350), 14 units (351-400), 16 units (401-450)    Jardiance 25 MG tablet tablet 1 tablet, Daily    lisinopril (PRINIVIL,ZESTRIL) 20 mg, Oral, Daily    metoprolol tartrate (LOPRESSOR) 25 MG tablet 1 tablet, 2 Times Daily    nicotine (NICODERM CQ) 21 MG/24HR patch 1 patch, Transdermal, Every 24 Hours    nitroglycerin (NITROSTAT) 0.4 MG SL tablet     OneTouch Verio test strip 1 each, Other, 4 Times Daily Before Meals & Nightly, Use as instructed    oxyCODONE-acetaminophen (PERCOCET) 5-325 MG per tablet 1 tablet    Ozempic, 2 MG/DOSE, 8 MG/3ML solution pen-injector INJECT 2 MG SUBCUTANEOUS ONCE A WEEKY    tiZANidine (ZANAFLEX) 4 MG tablet Take 1 tablet 3 times a day by oral route as needed for 30 days.    traZODone (DESYREL) 150 mg, Oral, Nightly    triamcinolone (KENALOG) 0.1 % cream APPLY A THIN LAYER TO AFFECTED AREA(S) TWICE DAILY    vitamin D (ERGOCALCIFEROL) 50,000 Units, Oral, Weekly       Medications Discontinued During This Encounter   Medication Reason    empagliflozin (JARDIANCE) 10 MG tablet tablet *Therapy completed    Lantus SoloStar 100 UNIT/ML injection pen *Therapy completed    Omega-3 Fatty Acids (EQL Omega 3 Fish Oil) 1000 MG capsule delayed-release *Therapy completed    polyethylene glycol (GoLYTELY) 236 g solution *Therapy completed    Continuous Glucose Sensor (Dexcom G7 Sensor) misc Reorder    atorvastatin (LIPITOR) 80 MG tablet Reorder    lisinopril (PRINIVIL,ZESTRIL) 20 MG tablet Reorder    Evolocumab (REPATHA) solution prefilled syringe injection Reorder    vitamin D (ERGOCALCIFEROL) 1.25 MG (84796 UT) capsule capsule Reorder    traZODone (DESYREL) 150 MG tablet Reorder    fenofibrate (TRICOR) 145 MG tablet Reorder        Patient or patient representative verbalized consent for the use of Ambient Listening during the visit with  JOSE RAUL Leung for chart documentation. 3/17/2025  14:40 EDT

## 2025-03-24 ENCOUNTER — CLINICAL SUPPORT (OUTPATIENT)
Dept: FAMILY MEDICINE CLINIC | Facility: CLINIC | Age: 55
End: 2025-03-24
Payer: COMMERCIAL

## 2025-03-24 ENCOUNTER — TELEPHONE (OUTPATIENT)
Dept: FAMILY MEDICINE CLINIC | Facility: CLINIC | Age: 55
End: 2025-03-24

## 2025-03-24 DIAGNOSIS — E78.2 MIXED HYPERLIPIDEMIA: ICD-10-CM

## 2025-03-24 DIAGNOSIS — E55.9 VITAMIN D DEFICIENCY: ICD-10-CM

## 2025-03-24 DIAGNOSIS — E78.5 DYSLIPIDEMIA, GOAL LDL BELOW 70: ICD-10-CM

## 2025-03-24 DIAGNOSIS — E78.1 HYPERTRIGLYCERIDEMIA: ICD-10-CM

## 2025-03-24 DIAGNOSIS — F17.219 CIGARETTE NICOTINE DEPENDENCE WITH NICOTINE-INDUCED DISORDER: ICD-10-CM

## 2025-03-24 DIAGNOSIS — Z71.6 ENCOUNTER FOR SMOKING CESSATION COUNSELING: ICD-10-CM

## 2025-03-24 DIAGNOSIS — I10 ESSENTIAL HYPERTENSION: ICD-10-CM

## 2025-03-24 LAB
25(OH)D3 SERPL-MCNC: 24.9 NG/ML (ref 30–100)
ALBUMIN SERPL-MCNC: 4 G/DL (ref 3.5–5.2)
ALBUMIN UR-MCNC: <1.2 MG/DL
ALBUMIN/GLOB SERPL: 1.4 G/DL
ALP SERPL-CCNC: 56 U/L (ref 39–117)
ALT SERPL W P-5'-P-CCNC: 35 U/L (ref 1–41)
ANION GAP SERPL CALCULATED.3IONS-SCNC: 15.8 MMOL/L (ref 5–15)
ARTICHOKE IGE QN: 32 MG/DL (ref 0–100)
AST SERPL-CCNC: 14 U/L (ref 1–40)
BASOPHILS # BLD AUTO: 0.07 10*3/MM3 (ref 0–0.2)
BASOPHILS NFR BLD AUTO: 0.7 % (ref 0–1.5)
BILIRUB SERPL-MCNC: 0.5 MG/DL (ref 0–1.2)
BUN SERPL-MCNC: 13 MG/DL (ref 6–20)
BUN/CREAT SERPL: 16.3 (ref 7–25)
CALCIUM SPEC-SCNC: 9.5 MG/DL (ref 8.6–10.5)
CHLORIDE SERPL-SCNC: 100 MMOL/L (ref 98–107)
CHOLEST SERPL-MCNC: 202 MG/DL (ref 0–200)
CO2 SERPL-SCNC: 20.2 MMOL/L (ref 22–29)
CREAT SERPL-MCNC: 0.8 MG/DL (ref 0.76–1.27)
CREAT UR-MCNC: 80.3 MG/DL
DEPRECATED RDW RBC AUTO: 43.9 FL (ref 37–54)
EGFRCR SERPLBLD CKD-EPI 2021: 105.2 ML/MIN/1.73
EOSINOPHIL # BLD AUTO: 0.21 10*3/MM3 (ref 0–0.4)
EOSINOPHIL NFR BLD AUTO: 2 % (ref 0.3–6.2)
ERYTHROCYTE [DISTWIDTH] IN BLOOD BY AUTOMATED COUNT: 13.9 % (ref 12.3–15.4)
GLOBULIN UR ELPH-MCNC: 2.8 GM/DL
GLUCOSE SERPL-MCNC: 174 MG/DL (ref 65–99)
HCT VFR BLD AUTO: 48.4 % (ref 37.5–51)
HDLC SERPL-MCNC: 26 MG/DL (ref 40–60)
HGB BLD-MCNC: 16.6 G/DL (ref 13–17.7)
IMM GRANULOCYTES # BLD AUTO: 0.03 10*3/MM3 (ref 0–0.05)
IMM GRANULOCYTES NFR BLD AUTO: 0.3 % (ref 0–0.5)
LDLC SERPL CALC-MCNC: ABNORMAL MG/DL
LDLC/HDLC SERPL: ABNORMAL {RATIO}
LYMPHOCYTES # BLD AUTO: 2.22 10*3/MM3 (ref 0.7–3.1)
LYMPHOCYTES NFR BLD AUTO: 20.8 % (ref 19.6–45.3)
MCH RBC QN AUTO: 29.5 PG (ref 26.6–33)
MCHC RBC AUTO-ENTMCNC: 34.3 G/DL (ref 31.5–35.7)
MCV RBC AUTO: 86.1 FL (ref 79–97)
MICROALBUMIN/CREAT UR: NORMAL MG/G{CREAT}
MONOCYTES # BLD AUTO: 1.02 10*3/MM3 (ref 0.1–0.9)
MONOCYTES NFR BLD AUTO: 9.5 % (ref 5–12)
NEUTROPHILS NFR BLD AUTO: 66.7 % (ref 42.7–76)
NEUTROPHILS NFR BLD AUTO: 7.14 10*3/MM3 (ref 1.7–7)
NRBC BLD AUTO-RTO: 0 /100 WBC (ref 0–0.2)
PLATELET # BLD AUTO: 217 10*3/MM3 (ref 140–450)
PMV BLD AUTO: 10.4 FL (ref 6–12)
POTASSIUM SERPL-SCNC: 4.1 MMOL/L (ref 3.5–5.2)
PROT SERPL-MCNC: 6.8 G/DL (ref 6–8.5)
RBC # BLD AUTO: 5.62 10*6/MM3 (ref 4.14–5.8)
SODIUM SERPL-SCNC: 136 MMOL/L (ref 136–145)
T4 FREE SERPL-MCNC: 1.07 NG/DL (ref 0.92–1.68)
TRIGL SERPL-MCNC: 1060 MG/DL (ref 0–150)
TSH SERPL DL<=0.05 MIU/L-ACNC: 1.47 UIU/ML (ref 0.27–4.2)
VLDLC SERPL-MCNC: ABNORMAL MG/DL
WBC NRBC COR # BLD AUTO: 10.69 10*3/MM3 (ref 3.4–10.8)

## 2025-03-24 PROCEDURE — 84443 ASSAY THYROID STIM HORMONE: CPT | Performed by: NURSE PRACTITIONER

## 2025-03-24 PROCEDURE — 80061 LIPID PANEL: CPT | Performed by: NURSE PRACTITIONER

## 2025-03-24 PROCEDURE — 83721 ASSAY OF BLOOD LIPOPROTEIN: CPT | Performed by: NURSE PRACTITIONER

## 2025-03-24 PROCEDURE — 82570 ASSAY OF URINE CREATININE: CPT | Performed by: NURSE PRACTITIONER

## 2025-03-24 PROCEDURE — 82043 UR ALBUMIN QUANTITATIVE: CPT | Performed by: NURSE PRACTITIONER

## 2025-03-24 PROCEDURE — 85025 COMPLETE CBC W/AUTO DIFF WBC: CPT | Performed by: NURSE PRACTITIONER

## 2025-03-24 PROCEDURE — 36415 COLL VENOUS BLD VENIPUNCTURE: CPT | Performed by: NURSE PRACTITIONER

## 2025-03-24 PROCEDURE — 84439 ASSAY OF FREE THYROXINE: CPT | Performed by: NURSE PRACTITIONER

## 2025-03-24 PROCEDURE — 80053 COMPREHEN METABOLIC PANEL: CPT | Performed by: NURSE PRACTITIONER

## 2025-03-24 PROCEDURE — 82306 VITAMIN D 25 HYDROXY: CPT | Performed by: NURSE PRACTITIONER

## 2025-03-24 RX ORDER — NICOTINE 21 MG/24HR
1 PATCH, TRANSDERMAL 24 HOURS TRANSDERMAL EVERY 24 HOURS
Qty: 28 EACH | Refills: 0 | Status: SHIPPED | OUTPATIENT
Start: 2025-03-24

## 2025-03-24 NOTE — TELEPHONE ENCOUNTER
Patient is here at the clinic requesting a prescription for smoking patches that was discussed at last appointment

## 2025-03-24 NOTE — PROGRESS NOTES
Venipuncture Blood Specimen Collection  Venipuncture performed in left arm  by Elise Peñaloza with good hemostasis. Patient tolerated the procedure well without complications.   03/24/25   Elise Peñaloza

## 2025-03-31 ENCOUNTER — HOSPITAL ENCOUNTER (OUTPATIENT)
Facility: HOSPITAL | Age: 55
Discharge: HOME | End: 2025-03-31
Admitting: NURSE PRACTITIONER
Payer: COMMERCIAL

## 2025-03-31 DIAGNOSIS — Z95.5 H/O HEART ARTERY STENT: ICD-10-CM

## 2025-03-31 DIAGNOSIS — Z87.898 HISTORY OF PALPITATIONS: ICD-10-CM

## 2025-03-31 DIAGNOSIS — R06.02 SOBOE (SHORTNESS OF BREATH ON EXERTION): ICD-10-CM

## 2025-03-31 PROCEDURE — 93306 TTE W/DOPPLER COMPLETE: CPT

## 2025-03-31 PROCEDURE — 93306 TTE W/DOPPLER COMPLETE: CPT | Performed by: INTERNAL MEDICINE

## 2025-04-01 LAB
AORTIC DIMENSIONLESS INDEX: 0.75 (DI)
ASCENDING AORTA: 3.2 CM
AV MEAN PRESS GRAD SYS DOP V1V2: 2 MMHG
BH CV ECHO MEAS - AO ROOT DIAM: 3 CM
BH CV ECHO MEAS - AO V2 VTI: 19.1 CM
BH CV ECHO MEAS - AVA(I,D): 3.7 CM2
BH CV ECHO MEAS - EDV(CUBED): 147.2 ML
BH CV ECHO MEAS - EDV(MOD-SP2): 113 ML
BH CV ECHO MEAS - EDV(MOD-SP4): 133 ML
BH CV ECHO MEAS - EF(MOD-SP2): 59.4 %
BH CV ECHO MEAS - EF(MOD-SP4): 59.2 %
BH CV ECHO MEAS - ESV(CUBED): 32.8 ML
BH CV ECHO MEAS - ESV(MOD-SP2): 45.9 ML
BH CV ECHO MEAS - ESV(MOD-SP4): 54.2 ML
BH CV ECHO MEAS - FS: 39.4 %
BH CV ECHO MEAS - IVS/LVPW: 1.24 CM
BH CV ECHO MEAS - IVSD: 1.09 CM
BH CV ECHO MEAS - LA DIMENSION: 3.7 CM
BH CV ECHO MEAS - LAT PEAK E' VEL: 10.1 CM/SEC
BH CV ECHO MEAS - LV DIASTOLIC VOL/BSA (35-75): 59.5 CM2
BH CV ECHO MEAS - LV MASS(C)D: 194.9 GRAMS
BH CV ECHO MEAS - LV MAX PG: 2.1 MMHG
BH CV ECHO MEAS - LV MEAN PG: 1 MMHG
BH CV ECHO MEAS - LV SYSTOLIC VOL/BSA (12-30): 24.2 CM2
BH CV ECHO MEAS - LV V1 MAX: 72.5 CM/SEC
BH CV ECHO MEAS - LV V1 VTI: 14.4 CM
BH CV ECHO MEAS - LVIDD: 5.3 CM
BH CV ECHO MEAS - LVIDS: 3.2 CM
BH CV ECHO MEAS - LVOT AREA: 4.9 CM2
BH CV ECHO MEAS - LVOT DIAM: 2.5 CM
BH CV ECHO MEAS - LVPWD: 0.88 CM
BH CV ECHO MEAS - MED PEAK E' VEL: 7.2 CM/SEC
BH CV ECHO MEAS - MV A MAX VEL: 69.5 CM/SEC
BH CV ECHO MEAS - MV DEC SLOPE: 331.5 CM/SEC2
BH CV ECHO MEAS - MV DEC TIME: 0.2 SEC
BH CV ECHO MEAS - MV E MAX VEL: 49.8 CM/SEC
BH CV ECHO MEAS - MV E/A: 0.72
BH CV ECHO MEAS - MV P1/2T: 71.1 MSEC
BH CV ECHO MEAS - MVA(P1/2T): 3.1 CM2
BH CV ECHO MEAS - RVDD: 3.7 CM
BH CV ECHO MEAS - SV(LVOT): 70.7 ML
BH CV ECHO MEAS - SV(MOD-SP2): 67.1 ML
BH CV ECHO MEAS - SV(MOD-SP4): 78.8 ML
BH CV ECHO MEAS - SVI(LVOT): 31.6 ML/M2
BH CV ECHO MEAS - SVI(MOD-SP2): 30 ML/M2
BH CV ECHO MEAS - SVI(MOD-SP4): 35.2 ML/M2
BH CV ECHO MEAS - TR MAX PG: 28.3 MMHG
BH CV ECHO MEAS - TR MAX VEL: 266 CM/SEC
BH CV ECHO MEASUREMENTS AVERAGE E/E' RATIO: 5.76
BH CV XLRA - TDI S': 9 CM/SEC
LEFT ATRIUM VOLUME INDEX: 16.6 ML/M2
LV EF BIPLANE MOD: 60.2 %
MV VALVE AREA BY PLANIMETRY: 3.73 CM2

## 2025-05-25 DIAGNOSIS — E11.65 TYPE 2 DIABETES MELLITUS WITH HYPERGLYCEMIA, WITH LONG-TERM CURRENT USE OF INSULIN: ICD-10-CM

## 2025-05-25 DIAGNOSIS — Z79.4 TYPE 2 DIABETES MELLITUS WITH HYPERGLYCEMIA, WITH LONG-TERM CURRENT USE OF INSULIN: ICD-10-CM

## 2025-05-27 RX ORDER — ACYCLOVIR 400 MG/1
TABLET ORAL
Qty: 1 EACH | Refills: 0 | Status: SHIPPED | OUTPATIENT
Start: 2025-05-27

## 2025-06-23 RX ORDER — FAMOTIDINE 40 MG/1
40 TABLET, FILM COATED ORAL DAILY
Qty: 90 TABLET | Refills: 0 | Status: SHIPPED | OUTPATIENT
Start: 2025-06-23

## 2025-07-17 ENCOUNTER — CLINICAL SUPPORT (OUTPATIENT)
Dept: FAMILY MEDICINE CLINIC | Facility: CLINIC | Age: 55
End: 2025-07-17
Payer: COMMERCIAL

## 2025-07-17 ENCOUNTER — LAB REQUISITION (OUTPATIENT)
Dept: LAB | Facility: HOSPITAL | Age: 55
End: 2025-07-17
Payer: COMMERCIAL

## 2025-07-17 DIAGNOSIS — Z79.4 TYPE 2 DIABETES MELLITUS WITH HYPERGLYCEMIA, WITH LONG-TERM CURRENT USE OF INSULIN: Primary | ICD-10-CM

## 2025-07-17 DIAGNOSIS — E11.65 TYPE 2 DIABETES MELLITUS WITH HYPERGLYCEMIA, WITH LONG-TERM CURRENT USE OF INSULIN: Primary | ICD-10-CM

## 2025-07-17 DIAGNOSIS — M54.51 VERTEBROGENIC LOW BACK PAIN: ICD-10-CM

## 2025-07-17 LAB — HBA1C MFR BLD: 7.1 % (ref 4.8–5.6)

## 2025-07-17 PROCEDURE — 36415 COLL VENOUS BLD VENIPUNCTURE: CPT | Performed by: NURSE PRACTITIONER

## 2025-07-17 PROCEDURE — 83036 HEMOGLOBIN GLYCOSYLATED A1C: CPT | Performed by: STUDENT IN AN ORGANIZED HEALTH CARE EDUCATION/TRAINING PROGRAM

## 2025-07-17 NOTE — PROGRESS NOTES
..  Venipuncture Blood Specimen Collection  Venipuncture performed in LT arm by Lluvia Kelsey MA with good hemostasis. Patient tolerated the procedure well without complications.   07/17/25   Lluvia Kelsey MA

## 2025-07-22 ENCOUNTER — TELEPHONE (OUTPATIENT)
Dept: FAMILY MEDICINE CLINIC | Facility: CLINIC | Age: 55
End: 2025-07-22

## 2025-08-05 DIAGNOSIS — G47.00 INSOMNIA, UNSPECIFIED TYPE: ICD-10-CM

## 2025-08-05 RX ORDER — TRAZODONE HYDROCHLORIDE 150 MG/1
150 TABLET ORAL NIGHTLY
Qty: 90 TABLET | Refills: 0 | Status: SHIPPED | OUTPATIENT
Start: 2025-08-05

## (undated) DEVICE — SNAR POLYP CAPTIFLEX XS/OVL 11X2.4MM 240CM 1P/U

## (undated) DEVICE — Device: Brand: DEFENDO AIR/WATER/SUCTION AND BIOPSY VALVE

## (undated) DEVICE — Device

## (undated) DEVICE — SOL IRRG H2O PL/BG 1000ML STRL

## (undated) DEVICE — PAD GRND REM POLYHESIVE A/ DISP

## (undated) DEVICE — THE SINGLE USE ETRAP – POLYP TRAP IS USED FOR SUCTION RETRIEVAL OF ENDOSCOPICALLY REMOVED POLYPS.: Brand: ETRAP

## (undated) DEVICE — DISPOSABLE DISTAL ATTACHMENT: Brand: DISPOSABLE DISTAL ATTACHMENT

## (undated) DEVICE — SOLIDIFIER LIQLOC PLS 1500CC BT